# Patient Record
Sex: MALE | Race: WHITE | NOT HISPANIC OR LATINO | Employment: OTHER | URBAN - NONMETROPOLITAN AREA
[De-identification: names, ages, dates, MRNs, and addresses within clinical notes are randomized per-mention and may not be internally consistent; named-entity substitution may affect disease eponyms.]

---

## 2017-05-01 ENCOUNTER — HOSPITAL ENCOUNTER (EMERGENCY)
Facility: HOSPITAL | Age: 82
Discharge: HOME | End: 2017-05-01
Attending: EMERGENCY MEDICINE | Admitting: EMERGENCY MEDICINE
Payer: MEDICARE

## 2017-05-01 VITALS
SYSTOLIC BLOOD PRESSURE: 136 MMHG | RESPIRATION RATE: 16 BRPM | TEMPERATURE: 98 F | HEART RATE: 60 BPM | OXYGEN SATURATION: 96 % | DIASTOLIC BLOOD PRESSURE: 69 MMHG

## 2017-05-01 DIAGNOSIS — R19.7 DIARRHEA, UNSPECIFIED TYPE: ICD-10-CM

## 2017-05-01 DIAGNOSIS — R10.9 ABDOMINAL CRAMPS: ICD-10-CM

## 2017-05-01 LAB
ABO + RH BLD: NORMAL
ABO + RH BLD: NORMAL
ALBUMIN SERPL-MCNC: 3.3 G/DL (ref 3.4–5)
ALP SERPL-CCNC: 55 U/L (ref 40–150)
ALT SERPL W P-5'-P-CCNC: 19 U/L (ref 0–70)
AMYLASE SERPL-CCNC: 39 U/L (ref 30–110)
ANION GAP SERPL CALCULATED.3IONS-SCNC: 4 MMOL/L (ref 3–14)
AST SERPL W P-5'-P-CCNC: 11 U/L (ref 0–45)
BASOPHILS # BLD AUTO: 0.1 10E9/L (ref 0–0.2)
BASOPHILS NFR BLD AUTO: 0.8 %
BILIRUB SERPL-MCNC: 0.5 MG/DL (ref 0.2–1.3)
BLD GP AB SCN SERPL QL: NORMAL
BLOOD BANK CMNT PATIENT-IMP: NORMAL
BUN SERPL-MCNC: 12 MG/DL (ref 7–30)
CALCIUM SERPL-MCNC: 9.1 MG/DL (ref 8.5–10.1)
CHLORIDE SERPL-SCNC: 99 MMOL/L (ref 94–109)
CO2 SERPL-SCNC: 33 MMOL/L (ref 20–32)
CREAT SERPL-MCNC: 0.8 MG/DL (ref 0.66–1.25)
CRP SERPL-MCNC: <2.9 MG/L (ref 0–8)
DIFFERENTIAL METHOD BLD: NORMAL
EOSINOPHIL # BLD AUTO: 0.2 10E9/L (ref 0–0.7)
EOSINOPHIL NFR BLD AUTO: 3.7 %
ERYTHROCYTE [DISTWIDTH] IN BLOOD BY AUTOMATED COUNT: 13.3 % (ref 10–15)
ERYTHROCYTE [SEDIMENTATION RATE] IN BLOOD BY WESTERGREN METHOD: 8 MM/H (ref 0–20)
EST. AVERAGE GLUCOSE BLD GHB EST-MCNC: 154 MG/DL
GFR SERPL CREATININE-BSD FRML MDRD: ABNORMAL ML/MIN/1.7M2
GLUCOSE SERPL-MCNC: 146 MG/DL (ref 70–99)
HBA1C MFR BLD: 7 % (ref 4.3–6)
HCT VFR BLD AUTO: 41.7 % (ref 40–53)
HEMOCCULT STL QL IA: NEGATIVE
HGB BLD-MCNC: 14.4 G/DL (ref 13.3–17.7)
IMM GRANULOCYTES # BLD: 0 10E9/L (ref 0–0.4)
IMM GRANULOCYTES NFR BLD: 0.2 %
LIPASE SERPL-CCNC: 120 U/L (ref 73–393)
LYMPHOCYTES # BLD AUTO: 1.1 10E9/L (ref 0.8–5.3)
LYMPHOCYTES NFR BLD AUTO: 17.9 %
MAGNESIUM SERPL-MCNC: 2.2 MG/DL (ref 1.6–2.3)
MCH RBC QN AUTO: 28.1 PG (ref 26.5–33)
MCHC RBC AUTO-ENTMCNC: 34.5 G/DL (ref 31.5–36.5)
MCV RBC AUTO: 81 FL (ref 78–100)
MONOCYTES # BLD AUTO: 0.5 10E9/L (ref 0–1.3)
MONOCYTES NFR BLD AUTO: 8.7 %
NEUTROPHILS # BLD AUTO: 4.3 10E9/L (ref 1.6–8.3)
NEUTROPHILS NFR BLD AUTO: 68.7 %
NRBC # BLD AUTO: 0 10*3/UL
NRBC BLD AUTO-RTO: 0 /100
PLATELET # BLD AUTO: 265 10E9/L (ref 150–450)
POTASSIUM SERPL-SCNC: 4.3 MMOL/L (ref 3.4–5.3)
PROT SERPL-MCNC: 6.6 G/DL (ref 6.8–8.8)
RBC # BLD AUTO: 5.12 10E12/L (ref 4.4–5.9)
SODIUM SERPL-SCNC: 136 MMOL/L (ref 133–144)
SPECIMEN EXP DATE BLD: NORMAL
TSH SERPL DL<=0.05 MIU/L-ACNC: 1.02 MU/L (ref 0.4–4)
WBC # BLD AUTO: 6.2 10E9/L (ref 4–11)

## 2017-05-01 PROCEDURE — 99284 EMERGENCY DEPT VISIT MOD MDM: CPT

## 2017-05-01 PROCEDURE — 86901 BLOOD TYPING SEROLOGIC RH(D): CPT | Performed by: EMERGENCY MEDICINE

## 2017-05-01 PROCEDURE — 83735 ASSAY OF MAGNESIUM: CPT | Performed by: EMERGENCY MEDICINE

## 2017-05-01 PROCEDURE — 36415 COLL VENOUS BLD VENIPUNCTURE: CPT | Performed by: EMERGENCY MEDICINE

## 2017-05-01 PROCEDURE — 82274 ASSAY TEST FOR BLOOD FECAL: CPT | Performed by: EMERGENCY MEDICINE

## 2017-05-01 PROCEDURE — 93005 ELECTROCARDIOGRAM TRACING: CPT

## 2017-05-01 PROCEDURE — 86140 C-REACTIVE PROTEIN: CPT | Performed by: EMERGENCY MEDICINE

## 2017-05-01 PROCEDURE — 85652 RBC SED RATE AUTOMATED: CPT | Performed by: EMERGENCY MEDICINE

## 2017-05-01 PROCEDURE — 82150 ASSAY OF AMYLASE: CPT | Performed by: EMERGENCY MEDICINE

## 2017-05-01 PROCEDURE — 25000128 H RX IP 250 OP 636: Performed by: EMERGENCY MEDICINE

## 2017-05-01 PROCEDURE — 99284 EMERGENCY DEPT VISIT MOD MDM: CPT | Performed by: EMERGENCY MEDICINE

## 2017-05-01 PROCEDURE — 86900 BLOOD TYPING SEROLOGIC ABO: CPT | Performed by: EMERGENCY MEDICINE

## 2017-05-01 PROCEDURE — 84443 ASSAY THYROID STIM HORMONE: CPT | Performed by: EMERGENCY MEDICINE

## 2017-05-01 PROCEDURE — 40000788 ZZHCL STATISTIC ESTIMATED AVERAGE GLUCOSE: Performed by: EMERGENCY MEDICINE

## 2017-05-01 PROCEDURE — 83036 HEMOGLOBIN GLYCOSYLATED A1C: CPT | Mod: GZ | Performed by: EMERGENCY MEDICINE

## 2017-05-01 PROCEDURE — 86850 RBC ANTIBODY SCREEN: CPT | Performed by: EMERGENCY MEDICINE

## 2017-05-01 PROCEDURE — 85025 COMPLETE CBC W/AUTO DIFF WBC: CPT | Performed by: EMERGENCY MEDICINE

## 2017-05-01 PROCEDURE — 80053 COMPREHEN METABOLIC PANEL: CPT | Performed by: EMERGENCY MEDICINE

## 2017-05-01 PROCEDURE — 83690 ASSAY OF LIPASE: CPT | Performed by: EMERGENCY MEDICINE

## 2017-05-01 PROCEDURE — 93010 ELECTROCARDIOGRAM REPORT: CPT | Performed by: INTERNAL MEDICINE

## 2017-05-01 RX ORDER — SODIUM CHLORIDE 9 MG/ML
INJECTION, SOLUTION INTRAVENOUS CONTINUOUS
Status: DISCONTINUED | OUTPATIENT
Start: 2017-05-01 | End: 2017-05-01 | Stop reason: HOSPADM

## 2017-05-01 RX ORDER — ALLOPURINOL 100 MG/1
200 TABLET ORAL DAILY
COMMUNITY

## 2017-05-01 RX ORDER — LIDOCAINE 40 MG/G
CREAM TOPICAL
Status: DISCONTINUED | OUTPATIENT
Start: 2017-05-01 | End: 2017-05-01 | Stop reason: HOSPADM

## 2017-05-01 RX ORDER — GLIPIZIDE 10 MG/1
10 TABLET ORAL
COMMUNITY

## 2017-05-01 RX ORDER — OMEGA-3-ACID ETHYL ESTERS 1 G/1
2 CAPSULE, LIQUID FILLED ORAL DAILY
COMMUNITY

## 2017-05-01 RX ORDER — PIOGLITAZONEHYDROCHLORIDE 45 MG/1
45 TABLET ORAL DAILY
Status: ON HOLD | COMMUNITY
End: 2022-02-02

## 2017-05-01 RX ADMIN — SODIUM CHLORIDE: 9 INJECTION, SOLUTION INTRAVENOUS at 11:00

## 2017-05-01 NOTE — ED AVS SNAPSHOT
[facility]    [address]    [place] [address]    Phone:  [phone]                                       Urban Huizar   MRN: [id number]    Department:  [facility]   Date of Visit:  5/1/2017           Patient Information     Date Of Birth          1935        Your diagnoses for this visit were:     Abdominal cramps     Diarrhea, unspecified type        You were seen by Desmond To MD.      Follow-up Information     Follow up with Clinic, [facility].    Why:  As needed    Contact information:    [address]  [address]  [place] [address]  [phone]          Discharge Instructions         Uncertain Causes of Diarrhea (Adult)    Diarrhea is when stools are loose and watery. This can be caused by:    Viral infections    Bacterial infections    Food poisoning    Parasites    Irritable bowel syndrome (IBS)    Inflammatory bowel diseases such as ulcerative colitis, Crohn's disease, and celiac disease    Food intolerance, such as to lactose, the sugar found in milk and milk products    Reaction to medicines like antibiotics, laxatives, cancer drugs, and antacids  Along with diarrhea, you may also have:    Abdominal pain and cramping    Nausea and vomiting    Loss of bowel control    Fever and chills    Bloody stools  In some cases, antibiotics may help to treat diarrhea. You may have a stool sample test. This is done to see what is causing your diarrhea, and if antibiotics will help treat it. The results of a stool sample test may take up to 2 days. The healthcare provider may not give you antibiotics until he or she has the stool test results.  Diarrhea can cause dehydration. This is the loss of too much water and other fluids from the body. When this occurs, body fluid must be replaced. This can be done with oral rehydration solutions. Oral rehydration solutions are available at drugstores and grocery stores without a prescription.  Home  care  Follow all instructions given by your healthcare provider. Rest at home for the next 24 hours, or until you feel better. Avoid caffeine, tobacco, and alcohol. These can make diarrhea, cramping, and pain worse.  If taking medicines:    Don t take over-the-counter diarrhea or nausea medicines unless your healthcare provider tells you to.    You may use acetaminophen or NSAID medicines like ibuprofen or naproxen to reduce pain and fever. Don t use these if you have chronic liver or kidney disease, or ever had a stomach ulcer or gastrointestinal bleeding. Don't use NSAID medicines if you are already taking one for another condition (like arthritis) or are on daily aspirin therapy (such as for heart disease or after a stroke). Talk with your healthcare provider first.    If antibiotics were prescribed, be sure you take them until they are finished. Don t stop taking them even when you feel better. Antibiotics must be taken as a full course.  To prevent the spread of illness:    Remember that washing with soap and water and using alcohol-based  is the best way to prevent the spread of infection.    Clean the toilet after each use.    Wash your hands before eating.    Wash your hands before and after preparing food. Keep in mind that people with diarrhea or vomiting should not prepare food for others.    Wash your hands after using cutting boards, countertops, and knives that have been in contact with raw foods.    Wash and then peel fruits and vegetables.    Keep uncooked meats away from cooked and ready-to-eat foods.    Use a food thermometer when cooking. Cook poultry to at least 165 F (74 C). Cook ground meat (beef, veal, pork, lamb) to at least 160 F (71 C). Cook fresh beef, veal, lamb, and pork to at least 145 F (63 C).    Don t eat raw or undercooked eggs (poached or duane side up), poultry, meat, or unpasteurized milk and juices.  Food and drinks  The main goal while treating vomiting or diarrhea is  to prevent dehydration. This is done by taking small amounts of liquids often.    Keep in mind that liquids are more important than food right now.    Drink only small amounts of liquids at a time.    Don t force yourself to eat, especially if you are having cramping, vomiting, or diarrhea. Don t eat large amounts at a time, even if you are hungry.    If you eat, avoid fatty, greasy, spicy, or fried foods.    Don t eat dairy foods or drink milk if you have diarrhea. These can make diarrhea worse.  During the first 24 hours you can try:    Oral rehydration solutions. Do not use sports drinks. They have too much sugar and not enough electrolytes.    Soft drinks without caffeine    Ginger ale    Water (plain or flavored)    Decaf tea or coffee    Clear broth, consommé, or bouillon    Gelatin, popsicles, or frozen fruit juice bars  The second 24 hours, if you are feeling better, you can add:    Hot cereal, plain toast, bread, rolls, or crackers    Plain noodles, rice, mashed potatoes, chicken noodle soup, or rice soup    Unsweetened canned fruit (no pineapple)    Bananas  As you recover:    Limit fat intake to less than 15 grams per day. Don t eat margarine, butter, oils, mayonnaise, sauces, gravies, fried foods, peanut butter, meat, poultry, or fish.    Limit fiber. Don t eat raw or cooked vegetables, fresh fruits except bananas, or bran cereals.    Limit caffeine and chocolate.    Limit dairy.    Don t use spices or seasonings except salt.    Go back to your normal diet over time, as you feel better and your symptoms improve.    If the symptoms come back, go back to a simple diet or clear liquids.  Follow-up care  Follow up with your healthcare provider, or as advised. If a stool sample was taken or cultures were done, call the healthcare provider for the results as instructed.  Call 911  Call 911 if you have any of these symptoms:    Trouble breathing    Confusion    Extreme drowsiness or trouble walking    Loss of  consciousness    Rapid heart rate    Chest pain    Stiff neck    Seizure  When to seek medical advice  Call your healthcare provider right away if any of these occur:    Abdominal pain that gets worse    Constant lower right abdominal pain    Continued vomiting and inability to keep liquids down    Diarrhea more than 5 times a day    Blood in vomit or stool    Dark urine or no urine for 8 hours, dry mouth and tongue, tiredness, weakness, or dizziness    Drowsiness    New rash    You don t get better in 2 to 3 days    Fever of 100.4 F (38 C) or higher that doesn t get lower with medicine    1772-8610 [employer]. [address] [place], [place] [address]. All rights reserved. This information is not intended as a substitute for professional medical care. Always follow your healthcare professional's instructions.    Urban,  Your black stool appeared brown today and did not have any chemical blood in it and your blood count was excellent.  With the persistent diarrhea, it would be a good idea to have your stools checked for parasites, bacteria, and C diff all of which can cause prolonged diarrhea.  Collect this at home and bring it in within 2 days.       ED Discharge Orders     Clostridium difficile toxin B PCR           Enteric Bacteria and Virus Panel by JADIEL Stool           Ova and Parasite Exam Routine                    Review of your medicines      Our records show that you are taking the medicines listed below. If these are incorrect, please call your family doctor or clinic.        Dose / Directions Last dose taken    ALLOPURINOL PO   Dose:  100 mg        Take 100 mg by mouth   Refills:  0        GLIPIZIDE PO   Dose:  10 mg        Take 10 mg by mouth   Refills:  0        GLUCOSAMINE SULFATE PO   Dose:  1000 mg        Take 1,000 mg by mouth   Refills:  0        METFORMIN HCL PO   Dose:  1000 mg        Take 1,000 mg by mouth 2 times daily (with meals)   Refills:  0        omega-3 acid ethyl esters 1  G capsule   Commonly known as:  Lovaza   Dose:  1 g        Take 1 g by mouth 2 times daily   Refills:  0        PIOGLITAZONE HCL PO   Dose:  45 mg        Take 45 mg by mouth   Refills:  0        * UNABLE TO FIND   Dose:  1000 mg        1,000 mg Cinnamon   Refills:  0        * UNABLE TO FIND   Dose:  500 mg        500 mg Tumeric   Refills:  0        VITAMIN C PO   Dose:  1000 mg        Take 1,000 mg by mouth   Refills:  0        VITAMIN D (CHOLECALCIFEROL) PO   Dose:  5000 Units        Take 5,000 Units by mouth daily   Refills:  0        VITAMIN E NATURAL PO   Dose:  1000 Units        Take 1,000 Units by mouth   Refills:  0        * Notice:  This list has 2 medication(s) that are the same as other medications prescribed for you. Read the directions carefully, and ask your doctor or other care provider to review them with you.            Procedures and tests performed during your visit     ABO/Rh type and screen    Amylase    CBC with platelets differential    CRP inflammation    Comprehensive metabolic panel    EKG 12-lead, tracing only    Erythrocyte sedimentation rate auto    Estimated Average Glucose    Hemoglobin A1c    Lipase    Magnesium    Occult blood fecal HGB immuno    Peripheral IV catheter    TSH      Orders Needing Specimen Collection     Ordered          05/01/17 1034  UA with Microscopic - STAT, Prio: STAT, Needs to be Collected     Scheduled Task Status   05/01/17 1034 Collect UA with Microscopic Open   Order Class:  PCU Collect                05/01/17 1034  Stool culture SSCE - STAT, Prio: STAT, Needs to be Collected     Scheduled Task Status   05/01/17 1034 Collect Stool culture SSCE Open   Order Class:  PCU Collect                05/01/17 1034  Fecal Lactoferrin - STAT, Prio: STAT, Needs to be Collected     Scheduled Task Status   05/01/17 1034 Collect Fecal Lactoferrin Open   Order Class:  PCU Collect                05/01/17 1034  Clostridium difficile toxin B PCR - ROUTINE, Prio: Routine, Needs  "to be Collected     Scheduled Task Status   17 1034 Collect Clostridium difficile toxin B PCR Open   Order Class:  PCU Collect                  Pending Results     Date and Time Order Name Status Description    2017 1034 ABO/Rh type and screen In process             Pending Culture Results     No orders found from 2017 to 2017.            Thank you for choosing [facility]       Thank you for choosing [facility] for your care. Our goal is always to provide you with excellent care. Hearing back from our patients is one way we can continue to improve our services. Please take a few minutes to complete the written survey that you may receive in the mail after you visit with us. Thank you!        [email or web address] Information     [email or web address] lets you send messages to your doctor, view your test results, renew your prescriptions, schedule appointments and more. To sign up, go to [email or web address] . Click on \"Log in\" on the left side of the screen, which will take you to the Welcome page. Then click on \"Sign up Now\" on the right side of the page.     You will be asked to enter the access code listed below, as well as some personal information. Please follow the directions to create your username and password.     Your access code is: [email or web address]  Expires: 2017 11:40 AM     Your access code will  in 90 days. If you need help or a new code, please call your [facility] clinic or [phone].        Care EveryWhere ID     This is your Care EveryWhere ID. This could be used by other organizations to access your [facility] medical records  [id number]        After Visit Summary       This is your record. Keep this with you and show to your community pharmacist(s) and doctor(s) at your next visit.                  "

## 2017-05-01 NOTE — DISCHARGE INSTRUCTIONS
Uncertain Causes of Diarrhea (Adult)    Diarrhea is when stools are loose and watery. This can be caused by:    Viral infections    Bacterial infections    Food poisoning    Parasites    Irritable bowel syndrome (IBS)    Inflammatory bowel diseases such as ulcerative colitis, Crohn's disease, and celiac disease    Food intolerance, such as to lactose, the sugar found in milk and milk products    Reaction to medicines like antibiotics, laxatives, cancer drugs, and antacids  Along with diarrhea, you may also have:    Abdominal pain and cramping    Nausea and vomiting    Loss of bowel control    Fever and chills    Bloody stools  In some cases, antibiotics may help to treat diarrhea. You may have a stool sample test. This is done to see what is causing your diarrhea, and if antibiotics will help treat it. The results of a stool sample test may take up to 2 days. The healthcare provider may not give you antibiotics until he or she has the stool test results.  Diarrhea can cause dehydration. This is the loss of too much water and other fluids from the body. When this occurs, body fluid must be replaced. This can be done with oral rehydration solutions. Oral rehydration solutions are available at drugstores and grocery stores without a prescription.  Home care  Follow all instructions given by your healthcare provider. Rest at home for the next 24 hours, or until you feel better. Avoid caffeine, tobacco, and alcohol. These can make diarrhea, cramping, and pain worse.  If taking medicines:    Don t take over-the-counter diarrhea or nausea medicines unless your healthcare provider tells you to.    You may use acetaminophen or NSAID medicines like ibuprofen or naproxen to reduce pain and fever. Don t use these if you have chronic liver or kidney disease, or ever had a stomach ulcer or gastrointestinal bleeding. Don't use NSAID medicines if you are already taking one for another condition (like arthritis) or are on daily  aspirin therapy (such as for heart disease or after a stroke). Talk with your healthcare provider first.    If antibiotics were prescribed, be sure you take them until they are finished. Don t stop taking them even when you feel better. Antibiotics must be taken as a full course.  To prevent the spread of illness:    Remember that washing with soap and water and using alcohol-based  is the best way to prevent the spread of infection.    Clean the toilet after each use.    Wash your hands before eating.    Wash your hands before and after preparing food. Keep in mind that people with diarrhea or vomiting should not prepare food for others.    Wash your hands after using cutting boards, countertops, and knives that have been in contact with raw foods.    Wash and then peel fruits and vegetables.    Keep uncooked meats away from cooked and ready-to-eat foods.    Use a food thermometer when cooking. Cook poultry to at least 165 F (74 C). Cook ground meat (beef, veal, pork, lamb) to at least 160 F (71 C). Cook fresh beef, veal, lamb, and pork to at least 145 F (63 C).    Don t eat raw or undercooked eggs (poached or duane side up), poultry, meat, or unpasteurized milk and juices.  Food and drinks  The main goal while treating vomiting or diarrhea is to prevent dehydration. This is done by taking small amounts of liquids often.    Keep in mind that liquids are more important than food right now.    Drink only small amounts of liquids at a time.    Don t force yourself to eat, especially if you are having cramping, vomiting, or diarrhea. Don t eat large amounts at a time, even if you are hungry.    If you eat, avoid fatty, greasy, spicy, or fried foods.    Don t eat dairy foods or drink milk if you have diarrhea. These can make diarrhea worse.  During the first 24 hours you can try:    Oral rehydration solutions. Do not use sports drinks. They have too much sugar and not enough electrolytes.    Soft drinks without  caffeine    Ginger ale    Water (plain or flavored)    Decaf tea or coffee    Clear broth, consommé, or bouillon    Gelatin, popsicles, or frozen fruit juice bars  The second 24 hours, if you are feeling better, you can add:    Hot cereal, plain toast, bread, rolls, or crackers    Plain noodles, rice, mashed potatoes, chicken noodle soup, or rice soup    Unsweetened canned fruit (no pineapple)    Bananas  As you recover:    Limit fat intake to less than 15 grams per day. Don t eat margarine, butter, oils, mayonnaise, sauces, gravies, fried foods, peanut butter, meat, poultry, or fish.    Limit fiber. Don t eat raw or cooked vegetables, fresh fruits except bananas, or bran cereals.    Limit caffeine and chocolate.    Limit dairy.    Don t use spices or seasonings except salt.    Go back to your normal diet over time, as you feel better and your symptoms improve.    If the symptoms come back, go back to a simple diet or clear liquids.  Follow-up care  Follow up with your healthcare provider, or as advised. If a stool sample was taken or cultures were done, call the healthcare provider for the results as instructed.  Call 911  Call 911 if you have any of these symptoms:    Trouble breathing    Confusion    Extreme drowsiness or trouble walking    Loss of consciousness    Rapid heart rate    Chest pain    Stiff neck    Seizure  When to seek medical advice  Call your healthcare provider right away if any of these occur:    Abdominal pain that gets worse    Constant lower right abdominal pain    Continued vomiting and inability to keep liquids down    Diarrhea more than 5 times a day    Blood in vomit or stool    Dark urine or no urine for 8 hours, dry mouth and tongue, tiredness, weakness, or dizziness    Drowsiness    New rash    You don t get better in 2 to 3 days    Fever of 100.4 F (38 C) or higher that doesn t get lower with medicine    [id number] [employer]. [address] [place] [address], [place] [address].  All rights reserved. This information is not intended as a substitute for professional medical care. Always follow your healthcare professional's instructions.    Urban,  Your black stool appeared brown today and did not have any chemical blood in it and your blood count was excellent.  With the persistent diarrhea, it would be a good idea to have your stools checked for parasites, bacteria, and C diff all of which can cause prolonged diarrhea.  Collect this at home and bring it in within 2 days.

## 2017-05-01 NOTE — ED NOTES
Patient complaining of gas for the last week.  Has been taking antacids since last week.  Noticed black stools on Saturday.  Patients skin is pink and denies vertigo or pain.  VSS.

## 2017-05-01 NOTE — ED PROVIDER NOTES
History     Chief Complaint   Patient presents with     Melena     since saturday     HPI  Urban Huizar is a 81 year old male who has had dark stools for 2 days.  He has been having loose 2-3 x/day diarrhea for the past 3 months giving him mild distress and cramping before he goes. No hx of gi bleed in past and on no meds for GERD or the like. Took pepto bismol sometime in the past 3 days.      I have reviewed the Medications, Allergies, Past Medical and Surgical History, and Social History in the Epic system.    Review of Systems   Gastrointestinal: Positive for blood in stool.   All other systems reviewed and are negative.    Physical Exam   BP: 136/63  Pulse: 62  Temp: 97.8  F (36.6  C)  Resp: 16  SpO2: 96 %  Physical Exam   Constitutional: He appears well-developed and well-nourished.   HENT:   Head: Normocephalic and atraumatic.   Eyes: Conjunctivae and EOM are normal. Pupils are equal, round, and reactive to light.   Neck: Normal range of motion. Neck supple.   Cardiovascular: Normal rate and regular rhythm.    Pulmonary/Chest: Effort normal and breath sounds normal.   Abdominal: Soft. Bowel sounds are normal. He exhibits no distension. There is no tenderness. There is no rebound.   Genitourinary: Rectal exam shows guaiac negative stool.   Genitourinary Comments: Soft dark brown hemoccult (-) stool.    Musculoskeletal: Normal range of motion.   Neurological: He is alert.     ED Course     ED Course     Procedures  ECG  Sinus bradycardia, otherwise normal ECG, QTc 380 ms    Critical Care time:  none  Labs Ordered and Resulted from Time of ED Arrival Up to the Time of Departure from the ED   COMPREHENSIVE METABOLIC PANEL - Abnormal; Notable for the following:        Result Value    Carbon Dioxide 33 (*)     Glucose 146 (*)     Albumin 3.3 (*)     Protein Total 6.6 (*)     All other components within normal limits   HEMOGLOBIN A1C - Abnormal; Notable for the following:     Hemoglobin A1C 7.0 (*)     All  other components within normal limits   CBC WITH PLATELETS DIFFERENTIAL   CRP INFLAMMATION   ERYTHROCYTE SEDIMENTATION RATE AUTO   MAGNESIUM   LIPASE   AMYLASE   TSH   OCCULT BLOOD FECAL HGB IMMUNO   ESTIMATED AVERAGE GLUCOSE   ORTHOSTATIC BLOOD PRESSURE AND PULSE   PERIPHERAL IV CATHETER   ABO/RH TYPE AND SCREEN       Assessments & Plan (with Medical Decision Making)   Urban had dark stool but hemoccult was negative and Hgb was 14.4 with normal platelets.  Labs were otherwise unremarkable and await post discharge out patient stool speciment to rule out toxic diarrhea or c diff.   I have reviewed the nursing notes.    I have reviewed the findings, diagnosis, plan and need for follow up with the patient.    Discharge Medication List as of 5/1/2017 11:43 AM          Final diagnoses:   Abdominal cramps   Diarrhea, unspecified type       5/1/2017   [facility]     Desmond To MD  05/03/17 0007

## 2017-05-01 NOTE — ED NOTES
"Pt presents with wife with c/o one episode of black stool Sunday afternoon. Pt states he took pepto bismol Sunday morning prior to the dark stools. Pt states that he did have some abd pain, but he feels better after having a bowel movement today. Pt denies n/v/d and dysuria. Pt also denies dizziness and states he feels \"normal.\" Pt alert and oriented and ambulatory without difficulty to ED room 2.   "

## 2017-05-01 NOTE — ED AVS SNAPSHOT
[facility]    [address] [address]    Phone:  [phone]                                       Urban Huizar   MRN: [id number]    Department:  [facility]   Date of Visit:  5/1/2017           After Visit Summary Signature Page     I have received my discharge instructions, and my questions have been answered. I have discussed any challenges I see with this plan with the nurse or doctor.    ..........................................................................................................................................  Patient/Patient Representative Signature      ..........................................................................................................................................  Patient Representative Print Name and Relationship to Patient    ..................................................               ................................................  Date                                            Time    ..........................................................................................................................................  Reviewed by Signature/Title    ...................................................              ..............................................  Date                                                            Time

## 2017-05-03 ASSESSMENT — ENCOUNTER SYMPTOMS: BLOOD IN STOOL: 1

## 2018-02-01 ENCOUNTER — TRANSFERRED RECORDS (OUTPATIENT)
Dept: HEALTH INFORMATION MANAGEMENT | Facility: HOSPITAL | Age: 83
End: 2018-02-01

## 2018-02-15 ENCOUNTER — TRANSFERRED RECORDS (OUTPATIENT)
Dept: HEALTH INFORMATION MANAGEMENT | Facility: HOSPITAL | Age: 83
End: 2018-02-15

## 2018-02-26 ENCOUNTER — ANESTHESIA EVENT (OUTPATIENT)
Dept: SURGERY | Facility: HOSPITAL | Age: 83
End: 2018-02-26
Payer: MEDICARE

## 2018-02-26 ENCOUNTER — ANESTHESIA (OUTPATIENT)
Dept: SURGERY | Facility: HOSPITAL | Age: 83
End: 2018-02-26
Payer: MEDICARE

## 2018-02-26 ENCOUNTER — HOSPITAL ENCOUNTER (OUTPATIENT)
Facility: HOSPITAL | Age: 83
Discharge: HOME | End: 2018-02-26
Attending: OPHTHALMOLOGY | Admitting: OPHTHALMOLOGY
Payer: MEDICARE

## 2018-02-26 ENCOUNTER — SURGERY (OUTPATIENT)
Age: 83
End: 2018-02-26

## 2018-02-26 VITALS
TEMPERATURE: 98 F | BODY MASS INDEX: 33.72 KG/M2 | WEIGHT: 254.4 LBS | DIASTOLIC BLOOD PRESSURE: 71 MMHG | OXYGEN SATURATION: 97 % | HEIGHT: 73 IN | SYSTOLIC BLOOD PRESSURE: 151 MMHG

## 2018-02-26 PROCEDURE — 37000009 ZZH ANESTHESIA TECHNICAL FEE, EACH ADDTL 15 MIN: Performed by: OPHTHALMOLOGY

## 2018-02-26 PROCEDURE — 36000058 ZZH SURGERY LEVEL 3 EA 15 ADDTL MIN: Performed by: OPHTHALMOLOGY

## 2018-02-26 PROCEDURE — 71000027 ZZH RECOVERY PHASE 2 EACH 15 MINS: Performed by: OPHTHALMOLOGY

## 2018-02-26 PROCEDURE — 99100 ANES PT EXTEME AGE<1 YR&>70: CPT | Performed by: NURSE ANESTHETIST, CERTIFIED REGISTERED

## 2018-02-26 PROCEDURE — 37000008 ZZH ANESTHESIA TECHNICAL FEE, 1ST 30 MIN: Performed by: OPHTHALMOLOGY

## 2018-02-26 PROCEDURE — 66984 XCAPSL CTRC RMVL W/O ECP: CPT | Performed by: ANESTHESIOLOGY

## 2018-02-26 PROCEDURE — 25000128 H RX IP 250 OP 636: Performed by: OPHTHALMOLOGY

## 2018-02-26 PROCEDURE — 25000125 ZZHC RX 250: Performed by: OPHTHALMOLOGY

## 2018-02-26 PROCEDURE — 25000125 ZZHC RX 250: Performed by: NURSE ANESTHETIST, CERTIFIED REGISTERED

## 2018-02-26 PROCEDURE — 27210794 ZZH OR GENERAL SUPPLY STERILE: Performed by: OPHTHALMOLOGY

## 2018-02-26 PROCEDURE — 25000132 ZZH RX MED GY IP 250 OP 250 PS 637: Mod: GY | Performed by: OPHTHALMOLOGY

## 2018-02-26 PROCEDURE — C9447 INJ, PHENYLEPHRINE KETOROLAC: HCPCS | Performed by: OPHTHALMOLOGY

## 2018-02-26 PROCEDURE — V2632 POST CHMBR INTRAOCULAR LENS: HCPCS | Performed by: OPHTHALMOLOGY

## 2018-02-26 PROCEDURE — 40000306 ZZH STATISTIC PRE PROC ASSESS II: Performed by: OPHTHALMOLOGY

## 2018-02-26 PROCEDURE — A9270 NON-COVERED ITEM OR SERVICE: HCPCS | Mod: GY | Performed by: OPHTHALMOLOGY

## 2018-02-26 PROCEDURE — 36000056 ZZH SURGERY LEVEL 3 1ST 30 MIN: Performed by: OPHTHALMOLOGY

## 2018-02-26 PROCEDURE — 25000128 H RX IP 250 OP 636: Performed by: NURSE ANESTHETIST, CERTIFIED REGISTERED

## 2018-02-26 PROCEDURE — 66984 XCAPSL CTRC RMVL W/O ECP: CPT | Performed by: NURSE ANESTHETIST, CERTIFIED REGISTERED

## 2018-02-26 DEVICE — LENS-SOFPORT 19.0: Type: IMPLANTABLE DEVICE | Site: EYE | Status: FUNCTIONAL

## 2018-02-26 RX ORDER — HYDRALAZINE HYDROCHLORIDE 20 MG/ML
2.5-5 INJECTION INTRAMUSCULAR; INTRAVENOUS EVERY 10 MIN PRN
Status: DISCONTINUED | OUTPATIENT
Start: 2018-02-26 | End: 2018-02-26 | Stop reason: HOSPADM

## 2018-02-26 RX ORDER — PHENYLEPHRINE HYDROCHLORIDE 100 MG/ML
1 SOLUTION/ DROPS OPHTHALMIC
Status: COMPLETED | OUTPATIENT
Start: 2018-02-26 | End: 2018-02-26

## 2018-02-26 RX ORDER — OXYCODONE HYDROCHLORIDE 5 MG/1
5 TABLET ORAL EVERY 4 HOURS PRN
Status: DISCONTINUED | OUTPATIENT
Start: 2018-02-26 | End: 2018-02-26 | Stop reason: HOSPADM

## 2018-02-26 RX ORDER — MOXIFLOXACIN 5 MG/ML
SOLUTION/ DROPS OPHTHALMIC PRN
Status: DISCONTINUED | OUTPATIENT
Start: 2018-02-26 | End: 2018-02-26 | Stop reason: HOSPADM

## 2018-02-26 RX ORDER — MOXIFLOXACIN 5 MG/ML
1 SOLUTION/ DROPS OPHTHALMIC
Status: DISCONTINUED | OUTPATIENT
Start: 2018-02-26 | End: 2018-02-26 | Stop reason: HOSPADM

## 2018-02-26 RX ORDER — ONDANSETRON 2 MG/ML
4 INJECTION INTRAMUSCULAR; INTRAVENOUS EVERY 30 MIN PRN
Status: DISCONTINUED | OUTPATIENT
Start: 2018-02-26 | End: 2018-02-26 | Stop reason: HOSPADM

## 2018-02-26 RX ORDER — NALOXONE HYDROCHLORIDE 0.4 MG/ML
.1-.4 INJECTION, SOLUTION INTRAMUSCULAR; INTRAVENOUS; SUBCUTANEOUS
Status: DISCONTINUED | OUTPATIENT
Start: 2018-02-26 | End: 2018-02-26 | Stop reason: HOSPADM

## 2018-02-26 RX ORDER — PROPARACAINE HYDROCHLORIDE 5 MG/ML
1 SOLUTION/ DROPS OPHTHALMIC ONCE
Status: COMPLETED | OUTPATIENT
Start: 2018-02-26 | End: 2018-02-26

## 2018-02-26 RX ORDER — LEVOBUNOLOL HYDROCHLORIDE 5 MG/ML
SOLUTION/ DROPS OPHTHALMIC PRN
Status: DISCONTINUED | OUTPATIENT
Start: 2018-02-26 | End: 2018-02-26 | Stop reason: HOSPADM

## 2018-02-26 RX ORDER — FENTANYL CITRATE 50 UG/ML
25-50 INJECTION, SOLUTION INTRAMUSCULAR; INTRAVENOUS
Status: DISCONTINUED | OUTPATIENT
Start: 2018-02-26 | End: 2018-02-26 | Stop reason: HOSPADM

## 2018-02-26 RX ORDER — TETRACAINE HYDROCHLORIDE 5 MG/ML
SOLUTION OPHTHALMIC PRN
Status: DISCONTINUED | OUTPATIENT
Start: 2018-02-26 | End: 2018-02-26 | Stop reason: HOSPADM

## 2018-02-26 RX ORDER — DICLOFENAC SODIUM 1 MG/ML
1 SOLUTION/ DROPS OPHTHALMIC
Status: CANCELLED | OUTPATIENT
Start: 2018-02-26

## 2018-02-26 RX ORDER — ALBUTEROL SULFATE 0.83 MG/ML
2.5 SOLUTION RESPIRATORY (INHALATION) EVERY 4 HOURS PRN
Status: DISCONTINUED | OUTPATIENT
Start: 2018-02-26 | End: 2018-02-26 | Stop reason: HOSPADM

## 2018-02-26 RX ORDER — PHENYLEPHRINE HYDROCHLORIDE 25 MG/ML
1 SOLUTION/ DROPS OPHTHALMIC
Status: DISCONTINUED | OUTPATIENT
Start: 2018-02-26 | End: 2018-02-26 | Stop reason: HOSPADM

## 2018-02-26 RX ORDER — MEPERIDINE HYDROCHLORIDE 25 MG/ML
12.5 INJECTION INTRAMUSCULAR; INTRAVENOUS; SUBCUTANEOUS
Status: DISCONTINUED | OUTPATIENT
Start: 2018-02-26 | End: 2018-02-26 | Stop reason: HOSPADM

## 2018-02-26 RX ORDER — DEXAMETHASONE SODIUM PHOSPHATE 4 MG/ML
4 INJECTION, SOLUTION INTRA-ARTICULAR; INTRALESIONAL; INTRAMUSCULAR; INTRAVENOUS; SOFT TISSUE EVERY 10 MIN PRN
Status: DISCONTINUED | OUTPATIENT
Start: 2018-02-26 | End: 2018-02-26 | Stop reason: HOSPADM

## 2018-02-26 RX ORDER — LIDOCAINE HYDROCHLORIDE 20 MG/ML
INJECTION, SOLUTION INFILTRATION; PERINEURAL PRN
Status: DISCONTINUED | OUTPATIENT
Start: 2018-02-26 | End: 2018-02-26

## 2018-02-26 RX ORDER — LIDOCAINE HYDROCHLORIDE 10 MG/ML
INJECTION, SOLUTION EPIDURAL; INFILTRATION; INTRACAUDAL; PERINEURAL PRN
Status: DISCONTINUED | OUTPATIENT
Start: 2018-02-26 | End: 2018-02-26 | Stop reason: HOSPADM

## 2018-02-26 RX ORDER — LABETALOL HYDROCHLORIDE 5 MG/ML
10 INJECTION, SOLUTION INTRAVENOUS
Status: DISCONTINUED | OUTPATIENT
Start: 2018-02-26 | End: 2018-02-26 | Stop reason: HOSPADM

## 2018-02-26 RX ORDER — ACETAMINOPHEN 325 MG/1
650 TABLET ORAL ONCE
Status: COMPLETED | OUTPATIENT
Start: 2018-02-26 | End: 2018-02-26

## 2018-02-26 RX ORDER — CYCLOPENTOLATE HYDROCHLORIDE 20 MG/ML
1 SOLUTION/ DROPS OPHTHALMIC
Status: COMPLETED | OUTPATIENT
Start: 2018-02-26 | End: 2018-02-26

## 2018-02-26 RX ORDER — PROMETHAZINE HYDROCHLORIDE 25 MG/ML
12.5 INJECTION, SOLUTION INTRAMUSCULAR; INTRAVENOUS
Status: DISCONTINUED | OUTPATIENT
Start: 2018-02-26 | End: 2018-02-26 | Stop reason: HOSPADM

## 2018-02-26 RX ORDER — PILOCARPINE HYDROCHLORIDE 10 MG/ML
SOLUTION/ DROPS OPHTHALMIC PRN
Status: DISCONTINUED | OUTPATIENT
Start: 2018-02-26 | End: 2018-02-26 | Stop reason: HOSPADM

## 2018-02-26 RX ORDER — ONDANSETRON 4 MG/1
4 TABLET, ORALLY DISINTEGRATING ORAL EVERY 30 MIN PRN
Status: DISCONTINUED | OUTPATIENT
Start: 2018-02-26 | End: 2018-02-26 | Stop reason: HOSPADM

## 2018-02-26 RX ADMIN — LIDOCAINE HYDROCHLORIDE 3 ML: 10 INJECTION, SOLUTION EPIDURAL; INFILTRATION; INTRACAUDAL; PERINEURAL at 08:44

## 2018-02-26 RX ADMIN — ACETAMINOPHEN 650 MG: 325 TABLET, FILM COATED ORAL at 07:23

## 2018-02-26 RX ADMIN — PHENYLEPHRINE HYDROCHLORIDE 1 DROP: 100 SOLUTION/ DROPS OPHTHALMIC at 07:53

## 2018-02-26 RX ADMIN — TETRACAINE HYDROCHLORIDE 2 DROP: 5 SOLUTION OPHTHALMIC at 08:45

## 2018-02-26 RX ADMIN — CYCLOPENTOLATE HYDROCHLORIDE 1 DROP: 20 SOLUTION/ DROPS OPHTHALMIC at 07:32

## 2018-02-26 RX ADMIN — PHENYLEPHRINE AND KETOROLAC 300 ML GIVEN: 10.16; 2.88 INJECTION, SOLUTION, CONCENTRATE INTRAOCULAR at 08:44

## 2018-02-26 RX ADMIN — LEVOBUNOLOL HYDROCHLORIDE 1 DROP: 5 SOLUTION/ DROPS OPHTHALMIC at 08:44

## 2018-02-26 RX ADMIN — MIDAZOLAM 1 MG: 1 INJECTION INTRAMUSCULAR; INTRAVENOUS at 08:24

## 2018-02-26 RX ADMIN — PROPARACAINE HYDROCHLORIDE 1 DROP: 5 SOLUTION/ DROPS OPHTHALMIC at 07:31

## 2018-02-26 RX ADMIN — PILOCARPINE HYDROCHLORIDE 1 DROP: 10 SOLUTION/ DROPS OPHTHALMIC at 08:45

## 2018-02-26 RX ADMIN — CYCLOPENTOLATE HYDROCHLORIDE 1 DROP: 20 SOLUTION/ DROPS OPHTHALMIC at 07:39

## 2018-02-26 RX ADMIN — LIDOCAINE HYDROCHLORIDE 80 MG: 20 INJECTION, SOLUTION INFILTRATION; PERINEURAL at 08:38

## 2018-02-26 RX ADMIN — PHENYLEPHRINE HYDROCHLORIDE 1 DROP: 25 SOLUTION/ DROPS OPHTHALMIC at 07:33

## 2018-02-26 RX ADMIN — MOXIFLOXACIN 0.5 ML: 5 SOLUTION/ DROPS OPHTHALMIC at 07:40

## 2018-02-26 RX ADMIN — PHENYLEPHRINE HYDROCHLORIDE 1 DROP: 25 SOLUTION/ DROPS OPHTHALMIC at 07:40

## 2018-02-26 RX ADMIN — MOXIFLOXACIN HYDROCHLORIDE 1 DROP: 5 SOLUTION/ DROPS OPHTHALMIC at 08:46

## 2018-02-26 RX ADMIN — Medication 0.8 ML: at 08:45

## 2018-02-26 ASSESSMENT — LIFESTYLE VARIABLES: TOBACCO_USE: 1

## 2018-02-26 NOTE — IP AVS SNAPSHOT
MRN:[id number]                      After Visit Summary   2/26/2018    Urban Huizar    MRN: [id number]           Thank you!     Thank you for choosing [facility] for your care. Our goal is always to provide you with excellent care. Hearing back from our patients is one way we can continue to improve our services. Please take a few minutes to complete the written survey that you may receive in the mail after you visit with us. Thank you!        Patient Information     Date Of Birth          1935        About your hospital stay     You were admitted on:  February 26, 2018 You last received care in the:  [facility]    You were discharged on:  February 26, 2018        Reason for your hospital stay       Cataract surgery left eye done.                  Who to Call     For medical emergencies, please call 911.  For non-urgent questions about your medical care, please call your primary care provider or clinic, [phone]  For questions related to your surgery, please call your surgery clinic        Attending Provider     Provider [facility]    Baljinder Shannon MD Ophthalmology       Primary Care Provider Office Phone # Fax #    [facility] [phone]      After Care Instructions     Nursing Communication 1       Eyedrops, shield, and activities per post op pamphlet.            Patient care order       Patient has Vigamox and Durezol and Ilevro drops at home.  These should be used:  Vigamox 1 drop operative eye QID for 1 week.  Durezol 1 drop operative eye BID for 4 weeks.  Ilevro 1 drop operative eye QD for 6 weeks.                  Follow-up Appointments     Follow-up and recommended labs and tests       Follow up tomorrow at the [facility].                  Further instructions from your care team           Post-Anesthesia Patient Instructions    IMMEDIATELY FOLLOWING SURGERY:  Do not drive or operate machinery for the first twenty four hours after  "surgery.  Do not make any important decisions for twenty four hours after surgery or while taking narcotic pain medications or sedatives.  If you develop intractable nausea and vomiting or a severe headache please notify your doctor immediately.    FOLLOW-UP:  Please make an appointment with your surgeon as instructed. You do not need to follow up with anesthesia unless specifically instructed to do so.    WOUND CARE INSTRUCTIONS (if applicable):  Keep a dry clean dressing on the anesthesia/puncture wound site if there is drainage.  Once the wound has quit draining you may leave it open to air.  Generally you should leave the bandage intact for twenty four hours unless there is drainage.  If the epidural site drains for more than 36-48 hours please call the anesthesia department.    QUESTIONS?:  Please feel free to call your physician or the hospital  if you have any questions, and they will be happy to assist you.       Pending Results     No orders found from 2/24/2018 to 2/27/2018.            Admission Information     Date & Time Provider Department Dept. Phone    2/26/2018 Baljinder Shannon MD [facility] Preop/Phase [phone]      Your Vitals Were     Blood Pressure Temperature Height Weight Pulse Oximetry BMI (Body Mass Index)    133/67 98  F (36.7  C) (Axillary) 1.854 m (6' 1\") 115.4 kg (254 lb 6.4 oz) 95% 33.56 kg/m2      [email or web address] Information     [employer] lets you send messages to your doctor, view your test results, renew your prescriptions, schedule appointments and more. To sign up, go to [email or web address] . Click on \"Log in\" on the left side of the screen, which will take you to the Welcome page. Then click on \"Sign up Now\" on the right side of the page.     You will be asked to enter the access code listed below, as well as some personal information. Please follow the directions to create your username and password.     Your access code is: [email or web address]  Expires: 5/27/2018  9:05 AM     Your " access code will  in 90 days. If you need help or a new code, please call your [facility] clinic or [phone].        Care EveryWhere ID     This is your Care EveryWhere ID. This could be used by other organizations to access your [facility] medical records  [id number]        Equal Access to Services     LIVAN REINA : Hadii aad ku hadareno Soomaali, waaxda luqadaha, qaybta kaalmada asuncion, maryana maxwell. So [facility] [phone].    ATENCIÓN: Si habla español, tiene a cortez disposición servicios gratuitos de asistencia lingüística. Samanthaame al [phone].    We comply with applicable federal civil rights laws and [place] laws. We do not discriminate on the basis of race, color, national origin, age, disability, sex, sexual orientation, or gender identity.               Review of your medicines      CONTINUE these medicines which have NOT CHANGED        Dose / Directions    ALLOPURINOL PO        Dose:  200 mg   Take 200 mg by mouth daily   Refills:  0       GLIPIZIDE PO        Dose:  10 mg   Take 10 mg by mouth Take 2 tablets by mouth every day and take one tablet every evening to decrease blood sugar.  Take 30 minutes before meal.   Refills:  0       GLUCOSAMINE SULFATE PO        Dose:  2000 mg   Take 2,000 mg by mouth 2 times daily   Refills:  0       METFORMIN HCL PO        Dose:  1000 mg   Take 1,000 mg by mouth 2 times daily (with meals)   Refills:  0       omega-3 acid ethyl esters 1 G capsule   Commonly known as:  Lovaza        Dose:  1200 mg   Take 1,200 mg by mouth 2 times daily   Refills:  0       PIOGLITAZONE HCL PO        Dose:  45 mg   Take 45 mg by mouth daily   Refills:  0       * UNABLE TO FIND        Dose:  1000 mg   1,000 mg daily Cinnamon   Refills:  0       * UNABLE TO FIND        Dose:  500 mg   500 mg daily Tumeric   Refills:  0       VITAMIN C PO        Dose:  1000 mg   Take 1,000 mg by mouth 2 times daily   Refills:  0       VITAMIN D (CHOLECALCIFEROL) PO         Dose:  5000 Units   Take 5,000 Units by mouth daily   Refills:  0       VITAMIN E NATURAL PO        Dose:  1000 Units   Take 1,000 Units by mouth   Refills:  0       * Notice:  This list has 2 medication(s) that are the same as other medications prescribed for you. Read the directions carefully, and ask your doctor or other care provider to review them with you.             Protect others around you: Learn how to safely use, store and throw away your medicines at www.disposemymeds.org.             Medication List: This is a list of all your medications and when to take them. Check marks below indicate your daily home schedule. Keep this list as a reference.      Medications           Morning Afternoon Evening Bedtime As Needed    ALLOPURINOL PO   Take 200 mg by mouth daily                                GLIPIZIDE PO   Take 10 mg by mouth Take 2 tablets by mouth every day and take one tablet every evening to decrease blood sugar.  Take 30 minutes before meal.                                GLUCOSAMINE SULFATE PO   Take 2,000 mg by mouth 2 times daily                                METFORMIN HCL PO   Take 1,000 mg by mouth 2 times daily (with meals)                                omega-3 acid ethyl esters 1 G capsule   Commonly known as:  Lovaza   Take 1,200 mg by mouth 2 times daily                                PIOGLITAZONE HCL PO   Take 45 mg by mouth daily                                * UNABLE TO FIND   1,000 mg daily Cinnamon   Last time this was given:  300 ml given on 2/26/2018  8:44 AM                                * UNABLE TO FIND   500 mg daily Tumeric   Last time this was given:  300 ml given on 2/26/2018  8:44 AM                                VITAMIN C PO   Take 1,000 mg by mouth 2 times daily                                VITAMIN D (CHOLECALCIFEROL) PO   Take 5,000 Units by mouth daily                                VITAMIN E NATURAL PO   Take 1,000 Units by mouth                                *  Notice:  This list has 2 medication(s) that are the same as other medications prescribed for you. Read the directions carefully, and ask your doctor or other care provider to review them with you.

## 2018-02-26 NOTE — ANESTHESIA CARE TRANSFER NOTE
Patient: Urban Huizar    Procedure(s):  PHACOEMULSIFICATION CATARACT EXTRACTION POSTERIOR CHAMBER LENS LEFT - Wound Class: I-Clean    Diagnosis: COMBINED CATARACTS LEFT  Diagnosis Additional Information: No value filed.    Anesthesia Type:   MAC     Note:  Airway :Room Air  Patient transferred to:Phase II  Comments: VSS, report to PACU RN.Handoff Report: Identifed the Patient, Identified the Reponsible Provider, Reviewed the pertinent medical history, Discussed the surgical course, Reviewed Intra-OP anesthesia mangement and issues during anesthesia, Set expectations for post-procedure period and Allowed opportunity for questions and acknowledgement of understanding      Vitals: (Last set prior to Anesthesia Care Transfer)    CRNA VITALS  2/26/2018 0828 - 2/26/2018 0859      2/26/2018             Resp Rate (set): 8                Electronically Signed By: RAYMUNDO De La O CRNA  February 26, 2018  8:59 AM

## 2018-02-26 NOTE — IP AVS SNAPSHOT
[facility]    [address] [address]    Phone:  [phone]                                       After Visit Summary   2/26/2018    Urban Huizar    MRN: [id number]           After Visit Summary Signature Page     I have received my discharge instructions, and my questions have been answered. I have discussed any challenges I see with this plan with the nurse or doctor.    ..........................................................................................................................................  Patient/Patient Representative Signature      ..........................................................................................................................................  Patient Representative Print Name and Relationship to Patient    ..................................................               ................................................  Date                                            Time    ..........................................................................................................................................  Reviewed by Signature/Title    ...................................................              ..............................................  Date                                                            Time

## 2018-02-26 NOTE — INTERVAL H&P NOTE
History and physical reviewed. Patient examined. No interval change in condition.  Baljinder Shannon

## 2018-02-26 NOTE — ANESTHESIA PREPROCEDURE EVALUATION
Anesthesia Evaluation     . Pt has had prior anesthetic.     No history of anesthetic complications          ROS/MED HX    ENT/Pulmonary:     (+)tobacco use, Past use quit 1977 packs/day  , . .    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 : . . . :. .       METS/Exercise Tolerance:     Hematologic:  - neg hematologic  ROS       Musculoskeletal:   (+) arthritis, , , other musculoskeletal- Gout      GI/Hepatic:  - neg GI/hepatic ROS       Renal/Genitourinary:  - ROS Renal section negative       Endo:     (+) type II DM Last HgA1c: 7.6 date: 2/15/2018 Obesity, .      Psychiatric:  - neg psychiatric ROS       Infectious Disease:  - neg infectious disease ROS       Malignancy:      - no malignancy   Other:    - neg other ROS                 Physical Exam      Airway   Mallampati: III  TM distance: >3 FB  Neck ROM: full    Dental   (+) upper dentures, lower dentures and missing    Cardiovascular   Rhythm and rate: regular and normal      Pulmonary    breath sounds clear to auscultation                    Anesthesia Plan      History & Physical Review  History and physical reviewed and following examination; no interval change.    ASA Status:  3 .    NPO Status:  > 8 hours    Plan for MAC with Other induction. Maintenance will be Other.  Reason for MAC:  Deep or markedly invasive procedure (G8), Procedure to face, neck, head or breast and Chronic cardiopulmonary disease (G9)  PONV prophylaxis:  Ondansetron (or other 5HT-3) and Dexamethasone or Solumedrol       Postoperative Care  Postoperative pain management:  IV analgesics, Oral pain medications and Multi-modal analgesia.      Consents  Anesthetic plan, risks, benefits and alternatives discussed with:  Patient..                          .

## 2018-02-26 NOTE — OR NURSING
Pateint discharged to home .  Tamy score 20. Pain level 0/10.  Discharged from unit via walking .

## 2018-02-26 NOTE — ANESTHESIA POSTPROCEDURE EVALUATION
Patient: Urban Huizar    Procedure(s):  PHACOEMULSIFICATION CATARACT EXTRACTION POSTERIOR CHAMBER LENS LEFT - Wound Class: I-Clean    Diagnosis:COMBINED CATARACTS LEFT  Diagnosis Additional Information: No value filed.    Anesthesia Type:  MAC    Note:  Anesthesia Post Evaluation    Patient location during evaluation: Phase 2 and Bedside  Patient participation: Able to fully participate in evaluation  Level of consciousness: awake and alert  Pain management: adequate  Airway patency: patent  Cardiovascular status: acceptable  Respiratory status: acceptable  Hydration status: stable  PONV: none     Anesthetic complications: None          Last vitals:  Vitals:    02/26/18 0910 02/26/18 0915 02/26/18 0920   BP: 151/75 151/71    Temp:      SpO2: 98% 98% 97%         Electronically Signed By: Willam Martins MD  February 26, 2018  9:59 AM

## 2019-08-19 ENCOUNTER — APPOINTMENT (OUTPATIENT)
Dept: GENERAL RADIOLOGY | Facility: HOSPITAL | Age: 84
DRG: 159 | End: 2019-08-19
Attending: PHYSICIAN ASSISTANT
Payer: MEDICARE

## 2019-08-19 ENCOUNTER — APPOINTMENT (OUTPATIENT)
Dept: GENERAL RADIOLOGY | Facility: HOSPITAL | Age: 84
DRG: 159 | End: 2019-08-19
Attending: INTERNAL MEDICINE
Payer: MEDICARE

## 2019-08-19 ENCOUNTER — HOSPITAL ENCOUNTER (INPATIENT)
Facility: HOSPITAL | Age: 84
LOS: 1 days | Discharge: ANOTHER HOSPITAL | DRG: 159 | End: 2019-08-19
Attending: PHYSICIAN ASSISTANT | Admitting: INTERNAL MEDICINE
Payer: MEDICARE

## 2019-08-19 ENCOUNTER — ANESTHESIA (OUTPATIENT)
Dept: SURGERY | Facility: HOSPITAL | Age: 84
DRG: 159 | End: 2019-08-19
Payer: MEDICARE

## 2019-08-19 ENCOUNTER — APPOINTMENT (OUTPATIENT)
Dept: CT IMAGING | Facility: HOSPITAL | Age: 84
DRG: 159 | End: 2019-08-19
Attending: PHYSICIAN ASSISTANT
Payer: MEDICARE

## 2019-08-19 ENCOUNTER — ANESTHESIA EVENT (OUTPATIENT)
Dept: SURGERY | Facility: HOSPITAL | Age: 84
DRG: 159 | End: 2019-08-19
Payer: MEDICARE

## 2019-08-19 VITALS
SYSTOLIC BLOOD PRESSURE: 138 MMHG | TEMPERATURE: 97.9 F | HEIGHT: 73 IN | DIASTOLIC BLOOD PRESSURE: 77 MMHG | WEIGHT: 242.51 LBS | BODY MASS INDEX: 32.14 KG/M2 | OXYGEN SATURATION: 99 % | RESPIRATION RATE: 12 BRPM

## 2019-08-19 DIAGNOSIS — L02.11 ABSCESS OF NECK: ICD-10-CM

## 2019-08-19 PROBLEM — I10 BENIGN ESSENTIAL HYPERTENSION: Status: ACTIVE | Noted: 2019-08-19

## 2019-08-19 LAB
ANION GAP SERPL CALCULATED.3IONS-SCNC: 5 MMOL/L (ref 3–14)
BASOPHILS # BLD AUTO: 0.1 10E9/L (ref 0–0.2)
BASOPHILS NFR BLD AUTO: 0.4 %
BUN SERPL-MCNC: 9 MG/DL (ref 7–30)
CALCIUM SERPL-MCNC: 8.7 MG/DL (ref 8.5–10.1)
CHLORIDE SERPL-SCNC: 89 MMOL/L (ref 94–109)
CO2 SERPL-SCNC: 32 MMOL/L (ref 20–32)
CREAT SERPL-MCNC: 0.62 MG/DL (ref 0.66–1.25)
CRP SERPL-MCNC: 24.3 MG/L (ref 0–8)
DEPRECATED S PYO AG THROAT QL EIA: NORMAL
DIFFERENTIAL METHOD BLD: ABNORMAL
EOSINOPHIL # BLD AUTO: 0.2 10E9/L (ref 0–0.7)
EOSINOPHIL NFR BLD AUTO: 1.4 %
ERYTHROCYTE [DISTWIDTH] IN BLOOD BY AUTOMATED COUNT: 12.6 % (ref 10–15)
GFR SERPL CREATININE-BSD FRML MDRD: >90 ML/MIN/{1.73_M2}
GLUCOSE BLDC GLUCOMTR-MCNC: 226 MG/DL (ref 70–99)
GLUCOSE SERPL-MCNC: 192 MG/DL (ref 70–99)
HCT VFR BLD AUTO: 40.2 % (ref 40–53)
HGB BLD-MCNC: 14.2 G/DL (ref 13.3–17.7)
IMM GRANULOCYTES # BLD: 0.1 10E9/L (ref 0–0.4)
IMM GRANULOCYTES NFR BLD: 0.4 %
LYMPHOCYTES # BLD AUTO: 1.3 10E9/L (ref 0.8–5.3)
LYMPHOCYTES NFR BLD AUTO: 10.6 %
MCH RBC QN AUTO: 28 PG (ref 26.5–33)
MCHC RBC AUTO-ENTMCNC: 35.3 G/DL (ref 31.5–36.5)
MCV RBC AUTO: 79 FL (ref 78–100)
MONOCYTES # BLD AUTO: 0.9 10E9/L (ref 0–1.3)
MONOCYTES NFR BLD AUTO: 7.3 %
NEUTROPHILS # BLD AUTO: 9.9 10E9/L (ref 1.6–8.3)
NEUTROPHILS NFR BLD AUTO: 79.9 %
NRBC # BLD AUTO: 0 10*3/UL
NRBC BLD AUTO-RTO: 0 /100
PLATELET # BLD AUTO: 482 10E9/L (ref 150–450)
POTASSIUM SERPL-SCNC: 4.5 MMOL/L (ref 3.4–5.3)
RBC # BLD AUTO: 5.07 10E12/L (ref 4.4–5.9)
SODIUM SERPL-SCNC: 126 MMOL/L (ref 133–144)
SPECIMEN SOURCE: NORMAL
WBC # BLD AUTO: 12.4 10E9/L (ref 4–11)

## 2019-08-19 PROCEDURE — 31536 LARYNGOSCOPY W/BX & OP SCOPE: CPT | Performed by: OTOLARYNGOLOGY

## 2019-08-19 PROCEDURE — 25000128 H RX IP 250 OP 636: Performed by: FAMILY MEDICINE

## 2019-08-19 PROCEDURE — 40000986 XR CHEST PORT 1 VW: Mod: TC

## 2019-08-19 PROCEDURE — 25800030 ZZH RX IP 258 OP 636: Performed by: INTERNAL MEDICINE

## 2019-08-19 PROCEDURE — 31536 LARYNGOSCOPY W/BX & OP SCOPE: CPT | Performed by: ANESTHESIOLOGY

## 2019-08-19 PROCEDURE — 88305 TISSUE EXAM BY PATHOLOGIST: CPT | Mod: TC | Performed by: OTOLARYNGOLOGY

## 2019-08-19 PROCEDURE — 00000146 ZZHCL STATISTIC GLUCOSE BY METER IP

## 2019-08-19 PROCEDURE — 0CBM8ZX EXCISION OF PHARYNX, VIA NATURAL OR ARTIFICIAL OPENING ENDOSCOPIC, DIAGNOSTIC: ICD-10-PCS | Performed by: OTOLARYNGOLOGY

## 2019-08-19 PROCEDURE — 20000003 ZZH R&B ICU

## 2019-08-19 PROCEDURE — 37000008 ZZH ANESTHESIA TECHNICAL FEE, 1ST 30 MIN: Performed by: OTOLARYNGOLOGY

## 2019-08-19 PROCEDURE — 99222 1ST HOSP IP/OBS MODERATE 55: CPT | Mod: 25 | Performed by: OTOLARYNGOLOGY

## 2019-08-19 PROCEDURE — 86140 C-REACTIVE PROTEIN: CPT | Performed by: PHYSICIAN ASSISTANT

## 2019-08-19 PROCEDURE — 27110028 ZZH OR GENERAL SUPPLY NON-STERILE: Performed by: OTOLARYNGOLOGY

## 2019-08-19 PROCEDURE — 87880 STREP A ASSAY W/OPTIC: CPT | Performed by: FAMILY MEDICINE

## 2019-08-19 PROCEDURE — 80048 BASIC METABOLIC PNL TOTAL CA: CPT | Performed by: PHYSICIAN ASSISTANT

## 2019-08-19 PROCEDURE — 25000128 H RX IP 250 OP 636: Performed by: PHYSICIAN ASSISTANT

## 2019-08-19 PROCEDURE — 25000128 H RX IP 250 OP 636: Performed by: OTOLARYNGOLOGY

## 2019-08-19 PROCEDURE — 93005 ELECTROCARDIOGRAM TRACING: CPT

## 2019-08-19 PROCEDURE — 27210794 ZZH OR GENERAL SUPPLY STERILE: Performed by: OTOLARYNGOLOGY

## 2019-08-19 PROCEDURE — 99284 EMERGENCY DEPT VISIT MOD MDM: CPT | Mod: Z6 | Performed by: FAMILY MEDICINE

## 2019-08-19 PROCEDURE — 85025 COMPLETE CBC W/AUTO DIFF WBC: CPT | Performed by: PHYSICIAN ASSISTANT

## 2019-08-19 PROCEDURE — 40000306 ZZH STATISTIC PRE PROC ASSESS II: Performed by: OTOLARYNGOLOGY

## 2019-08-19 PROCEDURE — 70491 CT SOFT TISSUE NECK W/DYE: CPT | Mod: TC

## 2019-08-19 PROCEDURE — 25000125 ZZHC RX 250: Performed by: NURSE ANESTHETIST, CERTIFIED REGISTERED

## 2019-08-19 PROCEDURE — 25000125 ZZHC RX 250: Performed by: PHYSICIAN ASSISTANT

## 2019-08-19 PROCEDURE — 31536 LARYNGOSCOPY W/BX & OP SCOPE: CPT | Performed by: NURSE ANESTHETIST, CERTIFIED REGISTERED

## 2019-08-19 PROCEDURE — 25500064 ZZH RX 255 OP 636: Performed by: RADIOLOGY

## 2019-08-19 PROCEDURE — 94002 VENT MGMT INPAT INIT DAY: CPT

## 2019-08-19 PROCEDURE — 31575 DIAGNOSTIC LARYNGOSCOPY: CPT | Mod: 59 | Performed by: OTOLARYNGOLOGY

## 2019-08-19 PROCEDURE — 87081 CULTURE SCREEN ONLY: CPT | Performed by: FAMILY MEDICINE

## 2019-08-19 PROCEDURE — 25000128 H RX IP 250 OP 636: Performed by: NURSE ANESTHETIST, CERTIFIED REGISTERED

## 2019-08-19 PROCEDURE — 99140 ANES COMP EMERGENCY COND: CPT | Performed by: NURSE ANESTHETIST, CERTIFIED REGISTERED

## 2019-08-19 PROCEDURE — 96374 THER/PROPH/DIAG INJ IV PUSH: CPT

## 2019-08-19 PROCEDURE — 25800030 ZZH RX IP 258 OP 636: Performed by: NURSE ANESTHETIST, CERTIFIED REGISTERED

## 2019-08-19 PROCEDURE — 99285 EMERGENCY DEPT VISIT HI MDM: CPT | Mod: 25

## 2019-08-19 PROCEDURE — 36000058 ZZH SURGERY LEVEL 3 EA 15 ADDTL MIN: Performed by: OTOLARYNGOLOGY

## 2019-08-19 PROCEDURE — 36000056 ZZH SURGERY LEVEL 3 1ST 30 MIN: Performed by: OTOLARYNGOLOGY

## 2019-08-19 PROCEDURE — 31575 DIAGNOSTIC LARYNGOSCOPY: CPT

## 2019-08-19 PROCEDURE — 71046 X-RAY EXAM CHEST 2 VIEWS: CPT | Mod: TC

## 2019-08-19 PROCEDURE — 37000009 ZZH ANESTHESIA TECHNICAL FEE, EACH ADDTL 15 MIN: Performed by: OTOLARYNGOLOGY

## 2019-08-19 PROCEDURE — 99235 HOSP IP/OBS SAME DATE MOD 70: CPT | Performed by: INTERNAL MEDICINE

## 2019-08-19 PROCEDURE — 25000128 H RX IP 250 OP 636: Performed by: INTERNAL MEDICINE

## 2019-08-19 PROCEDURE — 99100 ANES PT EXTEME AGE<1 YR&>70: CPT | Performed by: NURSE ANESTHETIST, CERTIFIED REGISTERED

## 2019-08-19 PROCEDURE — 25000125 ZZHC RX 250: Performed by: OTOLARYNGOLOGY

## 2019-08-19 PROCEDURE — 40000275 ZZH STATISTIC RCP TIME EA 10 MIN

## 2019-08-19 PROCEDURE — 96375 TX/PRO/DX INJ NEW DRUG ADDON: CPT

## 2019-08-19 RX ORDER — HYDROMORPHONE HYDROCHLORIDE 1 MG/ML
.3-.5 INJECTION, SOLUTION INTRAMUSCULAR; INTRAVENOUS; SUBCUTANEOUS EVERY 5 MIN PRN
Status: DISCONTINUED | OUTPATIENT
Start: 2019-08-19 | End: 2019-08-19 | Stop reason: HOSPADM

## 2019-08-19 RX ORDER — HYDRALAZINE HYDROCHLORIDE 20 MG/ML
2.5-5 INJECTION INTRAMUSCULAR; INTRAVENOUS EVERY 10 MIN PRN
Status: DISCONTINUED | OUTPATIENT
Start: 2019-08-19 | End: 2019-08-19 | Stop reason: HOSPADM

## 2019-08-19 RX ORDER — HYDROMORPHONE HYDROCHLORIDE 1 MG/ML
.3-.5 INJECTION, SOLUTION INTRAMUSCULAR; INTRAVENOUS; SUBCUTANEOUS
Status: DISCONTINUED | OUTPATIENT
Start: 2019-08-19 | End: 2019-08-19 | Stop reason: HOSPADM

## 2019-08-19 RX ORDER — CHLORHEXIDINE GLUCONATE ORAL RINSE 1.2 MG/ML
15 SOLUTION DENTAL EVERY 12 HOURS
Status: DISCONTINUED | OUTPATIENT
Start: 2019-08-19 | End: 2019-08-19 | Stop reason: HOSPADM

## 2019-08-19 RX ORDER — LIDOCAINE 40 MG/G
CREAM TOPICAL
Status: DISCONTINUED | OUTPATIENT
Start: 2019-08-19 | End: 2019-08-19 | Stop reason: HOSPADM

## 2019-08-19 RX ORDER — ONDANSETRON 2 MG/ML
4 INJECTION INTRAMUSCULAR; INTRAVENOUS EVERY 6 HOURS PRN
Status: DISCONTINUED | OUTPATIENT
Start: 2019-08-19 | End: 2019-08-19 | Stop reason: HOSPADM

## 2019-08-19 RX ORDER — SODIUM CHLORIDE, SODIUM LACTATE, POTASSIUM CHLORIDE, CALCIUM CHLORIDE 600; 310; 30; 20 MG/100ML; MG/100ML; MG/100ML; MG/100ML
INJECTION, SOLUTION INTRAVENOUS CONTINUOUS
Status: DISCONTINUED | OUTPATIENT
Start: 2019-08-19 | End: 2019-08-19 | Stop reason: HOSPADM

## 2019-08-19 RX ORDER — IPRATROPIUM BROMIDE AND ALBUTEROL SULFATE 2.5; .5 MG/3ML; MG/3ML
3 SOLUTION RESPIRATORY (INHALATION) EVERY 4 HOURS PRN
Status: DISCONTINUED | OUTPATIENT
Start: 2019-08-19 | End: 2019-08-19 | Stop reason: HOSPADM

## 2019-08-19 RX ORDER — ONDANSETRON 4 MG/1
4 TABLET, ORALLY DISINTEGRATING ORAL EVERY 30 MIN PRN
Status: DISCONTINUED | OUTPATIENT
Start: 2019-08-19 | End: 2019-08-19 | Stop reason: HOSPADM

## 2019-08-19 RX ORDER — NALOXONE HYDROCHLORIDE 0.4 MG/ML
.1-.4 INJECTION, SOLUTION INTRAMUSCULAR; INTRAVENOUS; SUBCUTANEOUS
Status: DISCONTINUED | OUTPATIENT
Start: 2019-08-19 | End: 2019-08-19 | Stop reason: HOSPADM

## 2019-08-19 RX ORDER — DEXTROSE MONOHYDRATE 25 G/50ML
25-50 INJECTION, SOLUTION INTRAVENOUS
Status: DISCONTINUED | OUTPATIENT
Start: 2019-08-19 | End: 2019-08-19 | Stop reason: HOSPADM

## 2019-08-19 RX ORDER — NICOTINE POLACRILEX 4 MG
15-30 LOZENGE BUCCAL
Status: DISCONTINUED | OUTPATIENT
Start: 2019-08-19 | End: 2019-08-19 | Stop reason: HOSPADM

## 2019-08-19 RX ORDER — OXYMETAZOLINE HYDROCHLORIDE 0.05 G/100ML
2 SPRAY NASAL ONCE
Status: COMPLETED | OUTPATIENT
Start: 2019-08-19 | End: 2019-08-19

## 2019-08-19 RX ORDER — LIDOCAINE HYDROCHLORIDE AND EPINEPHRINE 10; 10 MG/ML; UG/ML
INJECTION, SOLUTION INFILTRATION; PERINEURAL PRN
Status: DISCONTINUED | OUTPATIENT
Start: 2019-08-19 | End: 2019-08-19 | Stop reason: HOSPADM

## 2019-08-19 RX ORDER — FENTANYL CITRATE 50 UG/ML
25 INJECTION, SOLUTION INTRAMUSCULAR; INTRAVENOUS ONCE
Status: COMPLETED | OUTPATIENT
Start: 2019-08-19 | End: 2019-08-19

## 2019-08-19 RX ORDER — IPRATROPIUM BROMIDE AND ALBUTEROL SULFATE 2.5; .5 MG/3ML; MG/3ML
3 SOLUTION RESPIRATORY (INHALATION) EVERY 4 HOURS PRN
Status: ON HOLD | DISCHARGE
Start: 2019-08-19 | End: 2022-02-02

## 2019-08-19 RX ORDER — ONDANSETRON 2 MG/ML
4 INJECTION INTRAMUSCULAR; INTRAVENOUS EVERY 6 HOURS PRN
Status: ON HOLD | DISCHARGE
Start: 2019-08-19 | End: 2022-02-02

## 2019-08-19 RX ORDER — PROPOFOL 10 MG/ML
10-20 INJECTION, EMULSION INTRAVENOUS EVERY 30 MIN PRN
Status: ON HOLD | DISCHARGE
Start: 2019-08-19 | End: 2022-02-02

## 2019-08-19 RX ORDER — FENTANYL CITRATE 50 UG/ML
INJECTION, SOLUTION INTRAMUSCULAR; INTRAVENOUS PRN
Status: DISCONTINUED | OUTPATIENT
Start: 2019-08-19 | End: 2019-08-19

## 2019-08-19 RX ORDER — LIDOCAINE HYDROCHLORIDE 20 MG/ML
INJECTION, SOLUTION INFILTRATION; PERINEURAL PRN
Status: DISCONTINUED | OUTPATIENT
Start: 2019-08-19 | End: 2019-08-19

## 2019-08-19 RX ORDER — EPINEPHRINE 1 MG/ML
INJECTION, SOLUTION INTRAMUSCULAR; SUBCUTANEOUS PRN
Status: DISCONTINUED | OUTPATIENT
Start: 2019-08-19 | End: 2019-08-19 | Stop reason: HOSPADM

## 2019-08-19 RX ORDER — PROPOFOL 10 MG/ML
5-75 INJECTION, EMULSION INTRAVENOUS CONTINUOUS
Status: DISCONTINUED | OUTPATIENT
Start: 2019-08-19 | End: 2019-08-19 | Stop reason: HOSPADM

## 2019-08-19 RX ORDER — ALBUTEROL SULFATE 0.83 MG/ML
2.5 SOLUTION RESPIRATORY (INHALATION) EVERY 4 HOURS PRN
Status: DISCONTINUED | OUTPATIENT
Start: 2019-08-19 | End: 2019-08-19 | Stop reason: HOSPADM

## 2019-08-19 RX ORDER — DEXTROSE MONOHYDRATE 25 G/50ML
25-50 INJECTION, SOLUTION INTRAVENOUS
Status: ON HOLD | DISCHARGE
Start: 2019-08-19 | End: 2022-02-02

## 2019-08-19 RX ORDER — CHLORHEXIDINE GLUCONATE ORAL RINSE 1.2 MG/ML
15 SOLUTION DENTAL EVERY 12 HOURS
Status: ON HOLD | DISCHARGE
Start: 2019-08-19 | End: 2022-02-02

## 2019-08-19 RX ORDER — FENTANYL CITRATE 50 UG/ML
25-50 INJECTION, SOLUTION INTRAMUSCULAR; INTRAVENOUS
Status: DISCONTINUED | OUTPATIENT
Start: 2019-08-19 | End: 2019-08-19 | Stop reason: HOSPADM

## 2019-08-19 RX ORDER — PROPOFOL 10 MG/ML
5-75 INJECTION, EMULSION INTRAVENOUS CONTINUOUS
Status: ON HOLD | COMMUNITY
Start: 2019-08-19 | End: 2022-02-02

## 2019-08-19 RX ORDER — PROPOFOL 10 MG/ML
INJECTION, EMULSION INTRAVENOUS PRN
Status: DISCONTINUED | OUTPATIENT
Start: 2019-08-19 | End: 2019-08-19

## 2019-08-19 RX ORDER — PHENYLEPHRINE HCL IN 0.9% NACL 1 MG/10 ML
SYRINGE (ML) INTRAVENOUS PRN
Status: DISCONTINUED | OUTPATIENT
Start: 2019-08-19 | End: 2019-08-19

## 2019-08-19 RX ORDER — NICOTINE POLACRILEX 4 MG
15-30 LOZENGE BUCCAL
Status: ON HOLD | DISCHARGE
Start: 2019-08-19 | End: 2022-02-02

## 2019-08-19 RX ORDER — PROPOFOL 10 MG/ML
10-20 INJECTION, EMULSION INTRAVENOUS EVERY 30 MIN PRN
Status: DISCONTINUED | OUTPATIENT
Start: 2019-08-19 | End: 2019-08-19 | Stop reason: HOSPADM

## 2019-08-19 RX ORDER — ONDANSETRON 4 MG/1
4 TABLET, ORALLY DISINTEGRATING ORAL EVERY 6 HOURS PRN
Status: DISCONTINUED | OUTPATIENT
Start: 2019-08-19 | End: 2019-08-19 | Stop reason: HOSPADM

## 2019-08-19 RX ORDER — SODIUM CHLORIDE 9 MG/ML
INJECTION, SOLUTION INTRAVENOUS CONTINUOUS
Status: DISCONTINUED | OUTPATIENT
Start: 2019-08-19 | End: 2019-08-19 | Stop reason: HOSPADM

## 2019-08-19 RX ORDER — ONDANSETRON 2 MG/ML
4 INJECTION INTRAMUSCULAR; INTRAVENOUS EVERY 30 MIN PRN
Status: DISCONTINUED | OUTPATIENT
Start: 2019-08-19 | End: 2019-08-19 | Stop reason: HOSPADM

## 2019-08-19 RX ORDER — LABETALOL 20 MG/4 ML (5 MG/ML) INTRAVENOUS SYRINGE
10
Status: DISCONTINUED | OUTPATIENT
Start: 2019-08-19 | End: 2019-08-19 | Stop reason: HOSPADM

## 2019-08-19 RX ORDER — DEXAMETHASONE SODIUM PHOSPHATE 10 MG/ML
12 INJECTION INTRAMUSCULAR; INTRAVENOUS ONCE
Status: COMPLETED | OUTPATIENT
Start: 2019-08-19 | End: 2019-08-19

## 2019-08-19 RX ORDER — PROPOFOL 10 MG/ML
INJECTION, EMULSION INTRAVENOUS
Status: DISCONTINUED
Start: 2019-08-19 | End: 2019-08-19 | Stop reason: HOSPADM

## 2019-08-19 RX ORDER — DEXAMETHASONE SODIUM PHOSPHATE 4 MG/ML
4 INJECTION, SOLUTION INTRA-ARTICULAR; INTRALESIONAL; INTRAMUSCULAR; INTRAVENOUS; SOFT TISSUE
Status: DISCONTINUED | OUTPATIENT
Start: 2019-08-19 | End: 2019-08-19 | Stop reason: HOSPADM

## 2019-08-19 RX ORDER — SODIUM CHLORIDE 9 MG/ML
INJECTION, SOLUTION INTRAVENOUS CONTINUOUS PRN
Status: DISCONTINUED | OUTPATIENT
Start: 2019-08-19 | End: 2019-08-19

## 2019-08-19 RX ADMIN — OXYMETAZOLINE HYDROCHLORIDE 2 SPRAY: 0.05 SPRAY NASAL at 11:48

## 2019-08-19 RX ADMIN — FENTANYL CITRATE 25 MCG: 50 INJECTION, SOLUTION INTRAMUSCULAR; INTRAVENOUS at 14:09

## 2019-08-19 RX ADMIN — PROPOFOL 40 MCG/KG/MIN: 10 INJECTION, EMULSION INTRAVENOUS at 18:02

## 2019-08-19 RX ADMIN — DEXAMETHASONE SODIUM PHOSPHATE 12 MG: 10 INJECTION INTRAMUSCULAR; INTRAVENOUS at 12:23

## 2019-08-19 RX ADMIN — PROPOFOL 50 MG: 10 INJECTION, EMULSION INTRAVENOUS at 15:35

## 2019-08-19 RX ADMIN — ROCURONIUM BROMIDE 10 MG: 10 INJECTION INTRAVENOUS at 15:30

## 2019-08-19 RX ADMIN — SODIUM CHLORIDE: 9 INJECTION, SOLUTION INTRAVENOUS at 14:45

## 2019-08-19 RX ADMIN — ROCURONIUM BROMIDE 30 MG: 10 INJECTION INTRAVENOUS at 16:37

## 2019-08-19 RX ADMIN — LIDOCAINE HYDROCHLORIDE 40 MG: 20 INJECTION, SOLUTION INFILTRATION; PERINEURAL at 15:31

## 2019-08-19 RX ADMIN — MIDAZOLAM 4 MG: 1 INJECTION INTRAMUSCULAR; INTRAVENOUS at 16:48

## 2019-08-19 RX ADMIN — PROPOFOL 150 MG: 10 INJECTION, EMULSION INTRAVENOUS at 15:31

## 2019-08-19 RX ADMIN — IOHEXOL 100 ML: 300 INJECTION, SOLUTION INTRAVENOUS at 10:52

## 2019-08-19 RX ADMIN — Medication 100 MG: at 15:31

## 2019-08-19 RX ADMIN — SODIUM CHLORIDE 500 ML: 9 INJECTION, SOLUTION INTRAVENOUS at 11:31

## 2019-08-19 RX ADMIN — SODIUM CHLORIDE: 9 INJECTION, SOLUTION INTRAVENOUS at 17:12

## 2019-08-19 RX ADMIN — FENTANYL CITRATE 100 MCG: 50 INJECTION, SOLUTION INTRAMUSCULAR; INTRAVENOUS at 15:47

## 2019-08-19 RX ADMIN — TAZOBACTAM SODIUM AND PIPERACILLIN SODIUM 4.5 G: 500; 4 INJECTION, SOLUTION INTRAVENOUS at 11:49

## 2019-08-19 RX ADMIN — Medication 100 MCG: at 16:42

## 2019-08-19 RX ADMIN — FENTANYL CITRATE 50 MCG: 50 INJECTION, SOLUTION INTRAMUSCULAR; INTRAVENOUS at 16:10

## 2019-08-19 RX ADMIN — Medication 200 MCG: at 16:43

## 2019-08-19 RX ADMIN — FENTANYL CITRATE 100 MCG: 50 INJECTION, SOLUTION INTRAMUSCULAR; INTRAVENOUS at 16:48

## 2019-08-19 RX ADMIN — ROCURONIUM BROMIDE 30 MG: 10 INJECTION INTRAVENOUS at 15:39

## 2019-08-19 RX ADMIN — FENTANYL CITRATE 50 MCG: 50 INJECTION, SOLUTION INTRAMUSCULAR; INTRAVENOUS at 16:21

## 2019-08-19 RX ADMIN — FENTANYL CITRATE 100 MCG: 50 INJECTION, SOLUTION INTRAMUSCULAR; INTRAVENOUS at 15:30

## 2019-08-19 ASSESSMENT — ENCOUNTER SYMPTOMS
FEVER: 0
TROUBLE SWALLOWING: 1
MUSCULOSKELETAL NEGATIVE: 1
SORE THROAT: 1
CHOKING: 0
WOUND: 0
HEADACHES: 0
COUGH: 1
VOMITING: 0
CARDIOVASCULAR NEGATIVE: 1
NAUSEA: 0
CONFUSION: 0
VOICE CHANGE: 1
CHILLS: 0
SHORTNESS OF BREATH: 0
FATIGUE: 1
CHEST TIGHTNESS: 0
APPETITE CHANGE: 1
ABDOMINAL PAIN: 0
FATIGUE: 0

## 2019-08-19 ASSESSMENT — LIFESTYLE VARIABLES: TOBACCO_USE: 1

## 2019-08-19 ASSESSMENT — MIFFLIN-ST. JEOR: SCORE: 1858.88

## 2019-08-19 NOTE — ED PROVIDER NOTES
History     Chief Complaint   Patient presents with     Pharyngitis     HPI  Urbna Huizar is a 83 year old male who transferred over from urgent care to the emergency room because of difficulty swallowing and a muffled voice.  Patient states that he is had worsening over the last 2 weeks.  He is gone to the point where he cannot eat solid foods because he feels like he is going to choke.  He has been able to swallow.  Denies any fever or chills.  It got progressively worse about 4 5 days ago.  Mild cough.  Very little appetite and spitting up saliva.    Allergies:  No Known Allergies    Problem List:    Patient Active Problem List    Diagnosis Date Noted     Abscess of neck 08/19/2019     Priority: Medium     Type 2 diabetes mellitus without complication, without long-term current use of insulin (H) 08/19/2019     Priority: Medium     Benign essential hypertension 08/19/2019     Priority: Medium     Hyponatremia 08/19/2019     Priority: Medium        Past Medical History:    No past medical history on file.     Patient has a past history of diabetes but has not checked his sugars in over a year.  He states that he was told he had gout but he does not take his medication for that.  His present medication list is as noted in the chart.      Past Surgical History:    Past Surgical History:   Procedure Laterality Date     PHACOEMULSIFICATION WITH STANDARD INTRAOCULAR LENS IMPLANT Left 2/26/2018    Procedure: PHACOEMULSIFICATION WITH STANDARD INTRAOCULAR LENS IMPLANT;  PHACOEMULSIFICATION CATARACT EXTRACTION POSTERIOR CHAMBER LENS LEFT;  Surgeon: Baljinder Shannon MD;  Location: [facility] OR       Family History:    No family history on file.      Social History:  Marital Status:   [2]  Social History     Tobacco Use     Smoking status: Former Smoker     Packs/day: 4.00     Years: 30.00     Pack years: 120.00     Types: Cigarettes     Start date: 2/26/1946     Last attempt to quit: 2/26/1977     Years since  quittin.5     Smokeless tobacco: Never Used   Substance Use Topics     Alcohol use: Yes     Comment: rarely     Drug use: No        Medications:      ALLOPURINOL PO   Ascorbic Acid (VITAMIN C PO)   GLIPIZIDE PO   GLUCOSAMINE SULFATE PO   METFORMIN HCL PO   omega-3 acid ethyl esters (LOVAZA) 1 G capsule   PIOGLITAZONE HCL PO   UNABLE TO FIND   UNABLE TO FIND   VITAMIN D, CHOLECALCIFEROL, PO   VITAMIN E NATURAL PO         Review of Systems   Constitutional: Positive for appetite change. Negative for chills, fatigue and fever.   HENT: Positive for ear pain, sore throat and trouble swallowing. Negative for dental problem.    Respiratory: Positive for cough. Negative for choking, chest tightness and shortness of breath.    Cardiovascular: Negative.    Genitourinary: Negative.    Musculoskeletal: Negative.    Neurological: Negative for headaches.       Physical Exam   BP: 162/83  Heart Rate: 62  Temp: 96.8  F (36  C)  Resp: 18  SpO2: 97 %      Physical Exam   Constitutional: He is oriented to person, place, and time. He appears well-developed and well-nourished.  Non-toxic appearance. He does not appear ill. No distress.   HENT:   Head: Normocephalic and atraumatic.   Right Ear: Hearing and tympanic membrane normal. No swelling or tenderness.   Left Ear: Hearing and tympanic membrane normal. No swelling or tenderness.   Mouth/Throat: Uvula is midline, oropharynx is clear and moist and mucous membranes are normal.   NO tonsils noted. Tongue appears lifted up posteriorly   Eyes: Pupils are equal, round, and reactive to light. EOM are normal.   Neck: Normal range of motion. Neck supple.   Cardiovascular: Normal rate, regular rhythm and normal heart sounds.   Pulmonary/Chest: Effort normal and breath sounds normal.   Abdominal: Soft. Bowel sounds are normal. He exhibits no distension. There is no tenderness.   Neurological: He is alert and oriented to person, place, and time.   Skin: Skin is warm and dry. Capillary  refill takes less than 2 seconds.   Psychiatric: He has a normal mood and affect. His behavior is normal.   Nursing note and vitals reviewed.      ED Course     ED Course as of Aug 19 1402   Mon Aug 19, 2019   1050 128 corrected sodium for hyperglycemia      1113 Abscess right under tongue above hyoid; may be infected thyroglossal duct cyst; 2.7 cm      1131 Has not smoked in 40 years      1132 Spray with afrin nostril 2 sprays every 5 minutes x 2         Patient seen and examined.  ENT called and will be here to see patient.  Patient seen by ENT.  IR called and are unable to do interventional radiology to drain this abscess.  ENT will be taken to the OR at 3 PM this afternoon.  Hospitalist called.  Dr. Chapman accepted patient for admission.    1:55 PM Patient requested pain medication- will give low dose of Fentanyl. PAtient understands that we need to be careful because of his airway.     Results for orders placed or performed during the hospital encounter of 08/19/19 (from the past 24 hour(s))   Rapid strep screen   Result Value Ref Range    Specimen Description Throat     Rapid Strep A Screen       NEGATIVE: No Group A streptococcal antigen detected by immunoassay, await culture report.   CBC with platelets differential   Result Value Ref Range    WBC 12.4 (H) 4.0 - 11.0 10e9/L    RBC Count 5.07 4.4 - 5.9 10e12/L    Hemoglobin 14.2 13.3 - 17.7 g/dL    Hematocrit 40.2 40.0 - 53.0 %    MCV 79 78 - 100 fl    MCH 28.0 26.5 - 33.0 pg    MCHC 35.3 31.5 - 36.5 g/dL    RDW 12.6 10.0 - 15.0 %    Platelet Count 482 (H) 150 - 450 10e9/L    Diff Method Automated Method     % Neutrophils 79.9 %    % Lymphocytes 10.6 %    % Monocytes 7.3 %    % Eosinophils 1.4 %    % Basophils 0.4 %    % Immature Granulocytes 0.4 %    Nucleated RBCs 0 0 /100    Absolute Neutrophil 9.9 (H) 1.6 - 8.3 10e9/L    Absolute Lymphocytes 1.3 0.8 - 5.3 10e9/L    Absolute Monocytes 0.9 0.0 - 1.3 10e9/L    Absolute Eosinophils 0.2 0.0 - 0.7 10e9/L     Absolute Basophils 0.1 0.0 - 0.2 10e9/L    Abs Immature Granulocytes 0.1 0 - 0.4 10e9/L    Absolute Nucleated RBC 0.0    Basic metabolic panel   Result Value Ref Range    Sodium 126 (L) 133 - 144 mmol/L    Potassium 4.5 3.4 - 5.3 mmol/L    Chloride 89 (L) 94 - 109 mmol/L    Carbon Dioxide 32 20 - 32 mmol/L    Anion Gap 5 3 - 14 mmol/L    Glucose 192 (H) 70 - 99 mg/dL    Urea Nitrogen 9 7 - 30 mg/dL    Creatinine 0.62 (L) 0.66 - 1.25 mg/dL    GFR Estimate >90 >60 mL/min/[1.73_m2]    GFR Estimate If Black >90 >60 mL/min/[1.73_m2]    Calcium 8.7 8.5 - 10.1 mg/dL   CRP inflammation   Result Value Ref Range    CRP Inflammation 24.3 (H) 0.0 - 8.0 mg/L   Soft tissue neck CT w contrast    Narrative    CT SOFT TISSUE NECK W CONTRAST    HISTORY: 83 yearsMale Sore throat/stridor, epiglottis or tonsillitis  suspected    TECHNIQUE: Axial CT imaging of the neck was performed with intervenous  contrast. Coronal and sagittal reconstructions were obtained.    COMPARISON: None    FINDINGS: There is a fluid collection with irregular enhancing walls  at the midline of the neck, at the tongue base and anterior to the  vallecula. This fluid collection measures 2.7 x 2.7 x 2.3 cm in  dimension. It is located just above the hyoid.    The parotid glands submandibular glands and thyroid are unremarkable.    There is thickening of the epiglottis. There is no evidence of  lymphadenopathy or mass otherwise within the neck.      Impression    IMPRESSION: There is a midline abscess within the suprahyoid neck,  anterior to the vallecula at the tongue base. The abscess measures 2.7  x 2.7 x 2.3 cm in dimension. Location could be compatible with an  infected thyroglossal duct cyst.    There is thickening of the epiglottis, possibly reactive in nature.  Epiglottitis or cannot be excluded.    BRIAN CHADWICK MD   Chest XR,  PA & LAT    Narrative    XR CHEST 2 VW    HISTORY: 83 years Male cough x 2 weeks    COMPARISON: None    TECHNIQUE: 2 views of  the chest were obtained.    FINDINGS: Heart size and pulmonary vascularity are within normal  limits. There is subtle airspace opacity at the left lung base. The  lungs are otherwise clear.    There is mild interstitial prominence.      Impression    IMPRESSION: Subtle left basilar airspace opacity. Question early or  mild pneumonia versus atelectasis.    BRIAN CHADWICK MD       Medications   fentaNYL (PF) (SUBLIMAZE) injection 25 mcg (has no administration in time range)   sodium chloride (PF) 0.9% PF flush 60 mL (60 mLs Intravenous Given 8/19/19 1053)   iohexol (OMNIPAQUE) 300 mg/mL injection 100 mL (100 mLs Intravenous Given 8/19/19 1052)   0.9% sodium chloride BOLUS (0 mLs Intravenous Stopped 8/19/19 1224)   piperacillin-tazobactam (ZOSYN) intermittent infusion 4.5 g (0 g Intravenous Stopped 8/19/19 1224)   oxymetazoline (AFRIN) 0.05 % spray 2 spray (2 sprays Both Nostrils Given 8/19/19 1148)   dexamethasone (DECADRON) injection 12 mg (12 mg Intravenous Given 8/19/19 1223)       Assessments & Plan (with Medical Decision Making)     I have reviewed the nursing notes.    I have reviewed the findings, diagnosis, plan and need for follow up with the patient.    New Prescriptions    No medications on file       Final diagnoses:   Abscess of neck       8/19/2019   [facility]     Jb Temple MD  08/19/19 5377

## 2019-08-19 NOTE — CONSULTS
Otolaryngology Consultation    Patient: Urban Huizar  : 1935    CC:  odynophagia    HPI:  Urban Huizar is a 83 year old male seen today for 2 weeks of a progressive sore throat, difficulty swallowing tongue pain and otalgia.  He denied previous upper respiratory tract infection or any recent dental work no prior, he is edentulous.  He has not had any prior events similar to this and denies any recurrent swelling in the midline neck.  Quit tobacco over 40 years ago and is retired .    He has been NPO today aside from sip of black coffee.    There is been no weight loss fever chills or night sweats.        Current Outpatient Rx   Medication Sig Dispense Refill     ALLOPURINOL PO Take 200 mg by mouth daily        Ascorbic Acid (VITAMIN C PO) Take 1,000 mg by mouth 2 times daily        GLIPIZIDE PO Take 10 mg by mouth Take 2 tablets by mouth every day and take one tablet every evening to decrease blood sugar.  Take 30 minutes before meal.       GLUCOSAMINE SULFATE PO Take 2,000 mg by mouth 2 times daily        METFORMIN HCL PO Take 1,000 mg by mouth 2 times daily (with meals)       omega-3 acid ethyl esters (LOVAZA) 1 G capsule Take 1,200 mg by mouth 2 times daily        PIOGLITAZONE HCL PO Take 45 mg by mouth daily        UNABLE TO FIND 1,000 mg daily Cinnamon        UNABLE TO FIND 500 mg daily Tumeric        VITAMIN D, CHOLECALCIFEROL, PO Take 5,000 Units by mouth daily       VITAMIN E NATURAL PO Take 1,000 Units by mouth         Allergies: Patient has no known allergies.     No past medical history on file.    Past Surgical History:   Procedure Laterality Date     PHACOEMULSIFICATION WITH STANDARD INTRAOCULAR LENS IMPLANT Left 2018    Procedure: PHACOEMULSIFICATION WITH STANDARD INTRAOCULAR LENS IMPLANT;  PHACOEMULSIFICATION CATARACT EXTRACTION POSTERIOR CHAMBER LENS LEFT;  Surgeon: Baljinder Shannon MD;  Location: [facility]       ENT family history reviewed    Social History     Tobacco Use      Smoking status: Former Smoker     Packs/day: 4.00     Years: 30.00     Pack years: 120.00     Types: Cigarettes     Start date: 1946     Last attempt to quit: 1977     Years since quittin.5     Smokeless tobacco: Never Used   Substance Use Topics     Alcohol use: Yes     Comment: rarely     Drug use: No       Review of Systems  ROS: 10 point ROS neg other than the symptoms noted above in the HPI and joint pain    Physical Exam  /83   Temp 96.8  F (36  C) (Tympanic)   Resp 18   SpO2 97%   General - The patient is well nourished and well developed, and appears to have good nutritional status.  Alert and oriented to person and place, answers questions and cooperates with examination appropriately.   Head and Face - Normocephalic and atraumatic, with no gross asymmetry noted.  The facial nerve is intact, with strong symmetric movements.  Voice and Breathing - The patient was breathing comfortably without the use of accessory muscles. There was no wheezing, stridor, or stertor.  The patients voice was muffled, no  drooling no trismus no stridor   Ears -The external auditory canals are patent, the tympanic membranes are intact , no erythema or bulging  Eyes - Extraocular movements intact, and the pupils were reactive to light.  Sclera were not icteric or injected, conjunctiva were pink and moist.  Mouth - Examination of the oral cavity showed pink, healthy oral mucosa. No lesions or ulcerations noted.  The tongue was mobile and midline, and edentulous.  Bimanual palpation reveals a soft floor the mouth  Throat - The walls of the oropharynx were smooth, pink, moist, symmetric, and had no lesions or ulcerations.  The tonsillar pillars and soft palate were symmetric.  The uvula was midline on elevation.  Erythema extends from the midline tongue to the tongue base and he is exquisitely tender to palpation this area is full there is no exophytic mass  Neck - No palpable enlarged fixed cervical lymph  nodes.  No neck cysts or unusual tenderness to palpation.   No palpable fixed thyroid nodules or concerning goiter.  The trachea is grossly midline.  No midline neck mass or swelling  Nose - External contour is symmetric, no gross deflection or scars.  Nasal mucosa is pink and moist with no abnormal mucus.  No polyps, masses, or purulence noted on examination.    Attempts at mirror laryngoscopy were not possible due to gag reflex.  Therefore I proceeded with a fiberoptic examination after informed consent.  First I sprayed both sides of the nose with a mixture of lidocaine and neosynephrine.  I then passed the scope through the nasal cavity.     The nasopharynx was mucosally covered and symmetric.  The eustachian tube openings were unobstructed.  Going further down I had a clear view of the base of tongue which had fullness at the midline tongue base and erythema no barb purulence.  The vallecula was  obstructed by fullness of the tongue base The epiglottis was smooth and mucosally covered and mildly erythematous and edematous.  The supraglottic larynx was then clearly visualized.  The vocal cords moved smoothly and symmetrically and were pearly white.  The pyriform sinuses were open and without barb mass or pooling of secretions upon valsalva, and the limited view of the postcricoid region did not show any lesions.  The patient tolerated the procedure well.        IMAGING    CT neck was personally reviewed today's date.  There is an enhancing cystic well-defined mass just anterior to the epiglottis obstructing the vallecula at the base of the tongue the mass is just posterior to the hyoid.  There is no cervical lymphadenopathy.  The epiglottis is thickened                                                                 IMPRESSION: There is a midline abscess within the suprahyoid neck,  anterior to the vallecula at the tongue base. The abscess measures 2.7  x 2.7 x 2.3 cm in dimension. Location could be compatible  with an  infected thyroglossal duct cyst.      Impression and Plan- Urban Huizar is a 83 year old male with:  1.  Tongue base abscess, suspicious for thyroglossal duct cyst     This visit was in complications of incision and drainage central tongue abscess including anesthesia bleeding infection possible injury to the hypoglossal nerve with permanent tongue weakness speech and swallowing problems or need for further surgery.  If this is a thyroglossal duct cyst he may eventually need a formal Sistrunk procedure.  I told Urban I would recommend admission overnight and to remain intubated because he does have mild epiglottic edema and I will approach this transoral using endolaryngeal instruments.  He and Sheila, his wife, have no further questions.    NPO  Continue IV zosyn and IV decadron 12 mg every day, he has received full dose today  Decadron administered  Admit to hospitalist, expect extubation tomorrow and discharge within 48 hours        Madie Lew D.O.  Otolaryngology/Head and Neck Surgery  Allergy

## 2019-08-19 NOTE — PLAN OF CARE
Pt up from surgery at approx 1650, remains intubated. RT and MD at bedside to adjust vent settings. Lungs clear equal bilaterally. Scant in line secretions, small amount of pink tinged oral secretions. BPs stable. Normal sinus rhythm 70s. Afebrile. Propofol gtt initiated at 40mcg/kg/min. No restraints in place, pt not awake or pulling at lines/tubes at this time. Antonio catheter placed, 500ml urine out. Pt left facility at 1930 with [email or web address], placed on their ventilator, propofol gtt d/c'd. Report called to Dora ALMEIDA at [facility].

## 2019-08-19 NOTE — LETTER
9/3/2019     Urban Huizar  [address] [place] [address]      Dear Urban:    Thank you for allowing me to participate in your care. Your recent test results were reviewed and listed below.      Your results are provided below for your review  Results for orders placed or performed during the hospital encounter of 08/19/19   Soft tissue neck CT w contrast    Narrative    CT SOFT TISSUE NECK W CONTRAST    HISTORY: 83 yearsMale Sore throat/stridor, epiglottis or tonsillitis  suspected    TECHNIQUE: Axial CT imaging of the neck was performed with intervenous  contrast. Coronal and sagittal reconstructions were obtained.    COMPARISON: None    FINDINGS: There is a fluid collection with irregular enhancing walls  at the midline of the neck, at the tongue base and anterior to the  vallecula. This fluid collection measures 2.7 x 2.7 x 2.3 cm in  dimension. It is located just above the hyoid.    The parotid glands submandibular glands and thyroid are unremarkable.    There is thickening of the epiglottis. There is no evidence of  lymphadenopathy or mass otherwise within the neck.      Impression    IMPRESSION: There is a midline abscess within the suprahyoid neck,  anterior to the vallecula at the tongue base. The abscess measures 2.7  x 2.7 x 2.3 cm in dimension. Location could be compatible with an  infected thyroglossal duct cyst.    There is thickening of the epiglottis, possibly reactive in nature.  Epiglottitis or cannot be excluded.    BRIAN CHADWICK MD   Chest XR,  PA & LAT    Narrative    XR CHEST 2 VW    HISTORY: 83 years Male cough x 2 weeks    COMPARISON: None    TECHNIQUE: 2 views of the chest were obtained.    FINDINGS: Heart size and pulmonary vascularity are within normal  limits. There is subtle airspace opacity at the left lung base. The  lungs are otherwise clear.    There is mild interstitial prominence.      Impression    IMPRESSION: Subtle left basilar airspace  opacity. Question early or  mild pneumonia versus atelectasis.    BRIAN CHADWICK MD   XR Chest Port 1 View    Narrative    PROCEDURE:  XR CHEST PORT 1 VW    HISTORY: evaluate ETT. .    COMPARISON:  Radiographs earlier today.    FINDINGS:  Intubated, with endotracheal tube at the lower margin of the  clavicular heads approximately 5 cm above the eulogio in appropriate  position.  The cardiomediastinal contours are grossly unchanged. There is  calcific aortic atherosclerosis.   Interstitial markings are thickened. Lung volumes are slightly low. No  pneumothorax is seen.      Impression    IMPRESSION:  Intubated.    Interstitial thickening may reflect low lung volumes or developing  pulmonary edema. Recommend short interval follow-up.      NADIA BARON MD   CBC with platelets differential   Result Value Ref Range    WBC 12.4 (H) 4.0 - 11.0 10e9/L    RBC Count 5.07 4.4 - 5.9 10e12/L    Hemoglobin 14.2 13.3 - 17.7 g/dL    Hematocrit 40.2 40.0 - 53.0 %    MCV 79 78 - 100 fl    MCH 28.0 26.5 - 33.0 pg    MCHC 35.3 31.5 - 36.5 g/dL    RDW 12.6 10.0 - 15.0 %    Platelet Count 482 (H) 150 - 450 10e9/L    Diff Method Automated Method     % Neutrophils 79.9 %    % Lymphocytes 10.6 %    % Monocytes 7.3 %    % Eosinophils 1.4 %    % Basophils 0.4 %    % Immature Granulocytes 0.4 %    Nucleated RBCs 0 0 /100    Absolute Neutrophil 9.9 (H) 1.6 - 8.3 10e9/L    Absolute Lymphocytes 1.3 0.8 - 5.3 10e9/L    Absolute Monocytes 0.9 0.0 - 1.3 10e9/L    Absolute Eosinophils 0.2 0.0 - 0.7 10e9/L    Absolute Basophils 0.1 0.0 - 0.2 10e9/L    Abs Immature Granulocytes 0.1 0 - 0.4 10e9/L    Absolute Nucleated RBC 0.0    Basic metabolic panel   Result Value Ref Range    Sodium 126 (L) 133 - 144 mmol/L    Potassium 4.5 3.4 - 5.3 mmol/L    Chloride 89 (L) 94 - 109 mmol/L    Carbon Dioxide 32 20 - 32 mmol/L    Anion Gap 5 3 - 14 mmol/L    Glucose 192 (H) 70 - 99 mg/dL    Urea Nitrogen 9 7 - 30 mg/dL    Creatinine 0.62 (L) 0.66 - 1.25  mg/dL    GFR Estimate >90 >60 mL/min/[1.73_m2]    GFR Estimate If Black >90 >60 mL/min/[1.73_m2]    Calcium 8.7 8.5 - 10.1 mg/dL   CRP inflammation   Result Value Ref Range    CRP Inflammation 24.3 (H) 0.0 - 8.0 mg/L   Surgical pathology exam   Result Value Ref Range    Copath Report       Patient Name: DIOGENES SCHULZ  MR#: [id number]  Specimen #: [id number]  Collected: 8/19/2019  Received: 8/20/2019  Reported: 8/21/2019 13:04  Ordering Phy(s): AMMY ABREU  Copy To: [facility]  Fax: [phone]    For improved result formatting, select 'View Enhanced Report Format' under   Linked Documents section.    SPECIMEN(S):  A: Tongue biopsy, base  B: Tongue base, aspirate    FINAL DIAGNOSIS:  A: Tongue base, biopsy  - Focal mild acutely inflamed squamous mucosa    B: Tongue base, aspirate  - Blood    I have personally reviewed all specimens and/or slides, including the   listed special stains, and used them  with my medical judgement to determine or confirm the final diagnosis.    Electronically signed out by:    Benoit Mata M.D.    CLINICAL HISTORY:  Tongue abscess; incision and drainage central tongue abscess, direct   laryngoscopy.    GROSS:  A: There are three pieces of tan soft tissue which are 5 x 5 x 4 mm in   aggregate. (TE in 1 block).    B: There  are fragments of tan soft tissue which are approximately 5 x 5 x   1 mm in aggregate. (TE in 1 block).  (Dictated by: Benoit Mata MD 8/20/2019 02:50 PM)    MICROSCOPIC:  A: There is squamous mucosa with focal mild inflammation with mild   vascular dilation and increased numbers of  neutrophils within the subepithelium.    B: There is blood.    CPT Codes  A: 62091-NK6  B: 24299-YN3    COLLECTION SITE:  Client: [facility]  Location: [facility] (B)    The technical component of this testing was completed at the [facility], with the  professional component performed at the [facility], [address] [place] [address]  [address] [place] [id number]  ([phone])       Glucose by meter   Result Value Ref Range    Glucose 226 (H) 70 - 99 mg/dL   Rapid strep screen   Result Value Ref Range    Specimen Description Throat     Rapid Strep A Screen       NEGATIVE: No Group A streptococcal antigen detected by immunoassay, await culture report.   Beta strep group A culture   Result Value Ref Range    Specimen Description Throat     Culture Micro No beta hemolytic Streptococcus Group A isolated               Thank you for choosing [facility]. As a result, please continue with the treatment plan discussed in the office. Return as discussed or sooner if symptoms worsens or fail to improve. If you have any further questions or concerns, please do not hesitate to contact us.      Sincerely,    Madie Lew, DO  ENT Department  [address] [place] [address]  Phone: [phone]  Fax: [phone]

## 2019-08-19 NOTE — ANESTHESIA CARE TRANSFER NOTE
Patient: Urban Huizar    Procedure(s):  INCISION AND DRAINAGE CENTRAL TONGUE ABSCESS Direct Laryngoscopy    Diagnosis: TONGUE ABSCESS  Diagnosis Additional Information: No value filed.    Anesthesia Type:   General, ETT, RSI     Note:  Airway :ETT  Patient transferred to:ICU  ICU Handoff: Call for PAUSE to initiate/utilize ICU HANDOFF, Identified Patient, Identified Responsible Provider, Reviewed the Pertinent Medical History, Discussed Surgical Course, Reviewed Intra-OP Anesthesia Management and Issues during Anesthesia, Set Expectations for Post Procedure Period and Allowed Opportunity for Questions and Acknowledgement of Understanding      Vitals: (Last set prior to Anesthesia Care Transfer)    CRNA VITALS  8/19/2019 1617 - 8/19/2019 1717      8/19/2019             Resp Rate (observed):  8    Resp Rate (set):  8                Electronically Signed By: RAYMUNDO Alejo CRNA  August 19, 2019  5:28 PM

## 2019-08-19 NOTE — OP NOTE
Otolaryngology Operative Note     Pre-op Diagnosis: tongue base cyst or abscess  Post-op Diagnosis:  same  Procedure:  1.  Microdirect laryngoscopy with biopsies  Surgeon:  Madie Lew D.O.  Anesthesia:  General endotracheal  EBL:  10 ml  Findings: palpable cystic mass at distal tongue base just anterior to epiglottis, no fungating mass  severe aryepiglottic edema and moderate epiglottic edema  Complications:  none  Condition:  stable     Description of the Procedure  After surgical consent was obtained, the patient was brought to the operating room and laid in control in spine position.  He was administered a general anesthetic by member of anesthesia and intubation was performed without difficulty.    He was draped in the normal clean fashion a timeout was taken    I first palpated the tongue base and appreciated a 2.5 cm cyst.  There is mild erythema of the tongue base but no fungating mass.    I then placed a wet 4 x 4 to protect the gingiva he is edentulous.  I advanced the laryngoscope to the tongue base there is fullness along the tongue base without mass.  There is severe aryepiglottic edema and moderate epiglottic edema.  The false vocal cords have mild edema of the true cords are symmetric and without edema or mass.  The piriform sinuses are grossly clear in the postcricoid area is without mass.  I then directed the scope to the tongue base.   I anesthetized the tongue base with 1% lidocaine with 1-100,000 of epinephrine using an endolaryngeal needle.  The mucosa at the tongue base was irregular and biopsied under microscopy with cup forceps.    I then used the endo needle to attempt aspiration of the tongue base cyst without success.  The laryngoscope was removed the 4 x 4 was removed.  I then placed 2-0 silk retraction sutures into the tongue and retracted the tongue.  Sweetheart retractors were placed I can palpate at the cyst and by palpation, again attempted aspiration without success.   The area was too deep to approach with a 15 blade.    The area was irrigated and gently suctioned hemostasis is adequate.  The retraction sutures were removed  All counts were correct  The patient was handed back over to anesthesia and kept intubated.  He was transferred up to the ICU and will await transfer tonight to [facility] where interventional radiology has agreed to evaluate Urban for planned aspiration tomorrow.  I spoke with Dr. Angel, IR physician.  I spoke with  Dr. Torres medical/critical care at [facility] who accepted the patient.   I spoke with Dr. Chapman, hospitalist, who will arrange the transfer.   I spoke with Urban's wife and daughter and they are aware of the reason for transfer, all questions were answered.

## 2019-08-19 NOTE — PROGRESS NOTES
Patient returned from the OR to the ICU intubated.  Hemodynamically stable.  Blood pressures 112/56 O2 sats 98% heart rates in the 70s afebrile.  He is sedated currently on propofol.    Per verbal report from Dr. Lew she was unable to reach the suspected abscess.  Per anesthesia there is significant amount of epiglottic edema at the time of intubation.    Dr. Lew has spoke with [facility] critical care unit Dr. Torres was accepted the patient in transfer.  He has received the Decadron 12 mg and IV Zosyn 4.5 g both those were at roughly 1230 today.  He is also on the propofol drip.  He will be sent via ground ambulance to the [facility] critical care unit..

## 2019-08-19 NOTE — ANESTHESIA PREPROCEDURE EVALUATION
Anesthesia Pre-Procedure Evaluation    Patient: Urban Huizar   MRN: [id number] : 1935          Preoperative Diagnosis: TONGUE ABSCESS    Procedure(s):  INCISION AND DRAINAGE CENTRAL TONGUE ABSCESS    No past medical history on file.  Past Surgical History:   Procedure Laterality Date     PHACOEMULSIFICATION WITH STANDARD INTRAOCULAR LENS IMPLANT Left 2018    Procedure: PHACOEMULSIFICATION WITH STANDARD INTRAOCULAR LENS IMPLANT;  PHACOEMULSIFICATION CATARACT EXTRACTION POSTERIOR CHAMBER LENS LEFT;  Surgeon: Baljinder Shannon MD;  Location: [facility] OR       Anesthesia Evaluation     . Pt has had prior anesthetic.     No history of anesthetic complications          ROS/MED HX    ENT/Pulmonary: Comment: Chest x-ray shows a questionable left minimal infiltrate, though it is really not very visible    (+)tobacco use, Past use , . .    Neurologic: Comment: left subdural hematoma that was drained in  with antionette holes    (+)CVA date:  with deficits- minor speech impediment and a little bit of stuttering and word finding difficulty,     Cardiovascular:     (+) Dyslipidemia, hypertension----. : . . . :. .       METS/Exercise Tolerance:     Hematologic:         Musculoskeletal: Comment: gout  (+) arthritis,  other musculoskeletal- Gout      GI/Hepatic:         Renal/Genitourinary:     (+) Other Renal/ Genitourinary, Hyponatremia (Na: 126)      Endo:     (+) type II DM Last HgA1c: 6.9 date: 2017 .      Psychiatric:  - neg psychiatric ROS       Infectious Disease:  - neg infectious disease ROS       Malignancy:      - no malignancy   Other:    - neg other ROS                      Physical Exam  Normal systems: cardiovascular and pulmonary    Airway   Mallampati: III  TM distance: >3 FB  Neck ROM: full    Dental   (+) upper dentures, lower dentures and missing    Cardiovascular   Rhythm and rate: regular and normal      Pulmonary    breath sounds clear to auscultation            Lab Results   Component Value  "Date    WBC 12.4 (H) 08/19/2019    HGB 14.2 08/19/2019    HCT 40.2 08/19/2019     (H) 08/19/2019    CRP 24.3 (H) 08/19/2019    SED 8 05/01/2017     (L) 08/19/2019    POTASSIUM 4.5 08/19/2019    CHLORIDE 89 (L) 08/19/2019    CO2 32 08/19/2019    BUN 9 08/19/2019    CR 0.62 (L) 08/19/2019     (H) 08/19/2019    SANJAY 8.7 08/19/2019    MAG 2.2 05/01/2017    ALBUMIN 3.3 (L) 05/01/2017    PROTTOTAL 6.6 (L) 05/01/2017    ALT 19 05/01/2017    AST 11 05/01/2017    ALKPHOS 55 05/01/2017    BILITOTAL 0.5 05/01/2017    LIPASE 120 05/01/2017    AMYLASE 39 05/01/2017    TSH 1.02 05/01/2017       Preop Vitals  BP Readings from Last 3 Encounters:   08/19/19 162/83   02/26/18 151/71   05/01/17 136/69    Pulse Readings from Last 3 Encounters:   05/01/17 60      Resp Readings from Last 3 Encounters:   08/19/19 18   05/01/17 16    SpO2 Readings from Last 3 Encounters:   08/19/19 97%   02/26/18 97%   05/01/17 96%      Temp Readings from Last 1 Encounters:   08/19/19 96.8  F (36  C) (Tympanic)    Ht Readings from Last 1 Encounters:   02/26/18 1.854 m (6' 1\")      Wt Readings from Last 1 Encounters:   02/26/18 115.4 kg (254 lb 6.4 oz)    Estimated body mass index is 33.56 kg/m  as calculated from the following:    Height as of 2/26/18: 1.854 m (6' 1\").    Weight as of 2/26/18: 115.4 kg (254 lb 6.4 oz).       Anesthesia Plan      History & Physical Review  History and physical reviewed and following examination; no interval change.    ASA Status:  3 emergent.    NPO Status:  > 8 hours    Plan for General, ETT and RSI with Intravenous and Propofol induction. Maintenance will be Balanced.    PONV prophylaxis:  Ondansetron (or other 5HT-3) and Dexamethasone or Solumedrol  Additional equipment: Videolaryngoscope Pt is to remain intubated till tomorrow due to epiglottis swelling.      Postoperative Care  Postoperative pain management:  IV analgesics and Oral pain medications.      Consents  Anesthetic plan, risks, benefits and " alternatives discussed with:  Patient and Spouse..                 RAYMUNDO Alejo CRNA

## 2019-08-19 NOTE — ED TRIAGE NOTES
Patient presents with complaints of a sore throat x 2-3 weeks.  Voice is muffled.  Patient denies any knowledge of running fevers.

## 2019-08-19 NOTE — PROGRESS NOTES
Urban was kept intubated and sent up to the ICU and will await transfer tonight to [facility] where interventional radiology has agreed to evaluate Urban for planned aspiration tomorrow.  I spoke with Dr. Angel, IR physician.  I spoke with  Dr. Torres medical/critical care at [facility] who accepted the patient.   I spoke with Dr. Chapman, hospitalist, who will arrange the transfer.   I spoke with Urban's wife and daughter and they are aware of the reason for transfer, all questions were answered.

## 2019-08-19 NOTE — H&P
Admitted:     08/19/2019      CHIEF COMPLAINT:  Difficulty swallowing.      HISTORY OF PRESENT ILLNESS:  Urban is an 83-year-old gentleman who [facility]  He has had a couple weeks of sort of a cough and sore throat that seemingly just started to get worse over this last week or so, a great deal of difficulty swallowing this week.  A cough, a pretty sore throat also.  No barb aspiration at all.  He has had no fevers or shaking chills, no hemoptysis, no major shortness of breath at all.  It was just getting to the point where he could not eat any solid, he has just been taking sips of water.      No chest pains or palpitations or syncope.  No abdominal pain.  He has had some loose stools due to the fact he is only taking liquids in.  No blood in his stool at all.  No difficulty voiding.  No peripheral edema.  No skin rashes at all.  Weight has been stable as far as he knows, although has not weighed himself.  No peripheral edema.  No palpitations at all.  Blood sugars:  He has not been checking his blood sugars at all.  He does have diabetes and is on oral agents.      He was seen in the ER.  He was hemodynamically stable, a little bit hypertension, heart rate was okay.  He was afebrile.  His sats were 98% on room air.  Does have sort of a hot or muffled voice.  Sometimes it with a few coughing jags.  They did do a CT scan of his neck and it shows a roughly 2-2.5 cm abscess just above his vallecula and suprahyoid area.  He was seen in the ER by Dr. Lew of ENT, was given a dose of Zosyn along with Decadron and the tentative plan is for him to go to the OR later this afternoon for drainage of this abscess.      Normally, he is very active.  He is up a bit dizzy.  He was out fishing this last week or so.  Denies any shortness of breath or anything that really slows him down much at all.      PAST MEDICAL HISTORY:  Significant for:   1. Diabetes mellitus type 2.  He is on glipizide, metformin  and pioglitazone.  His last A1c was 6.9%.  This was back in 2017, the only one I have available.   2. History of some hypertension, although not on any new medications.   3. He has a history of a left subdural hematoma that was drained in  with antionette holes, was left with some minor speech impediment and a little bit of stuttering and word finding difficulty.   4. Appendectomy.   5. Umbilical hernia repair.   6. Rectal fissure repair.   7. Tonsillectomy.   8. History of gout.   9. Status post left total knee arthroplasty.      ALLERGIES:  HE HAS NO KNOWN DRUG ALLERGIES.      MEDICATIONS:  Glipizide 10 mg, he takes 2 tabs every day and 1 tablet every evening, metformin 1000 mg twice a day, pioglitazone 45 mg tab 1 tablet every day.  Takes some lipid, glucosamine, cholecalciferol, and ascorbic acid also.  Also takes allopurinol 100 mg, takes 2 tabs daily.      SOCIAL HISTORY:  He is retired.  He was in the [employer] from  to .  Lives on [address].  He has 2 children.  Nonsmoker, quit 40 years ago.  Rare beer drinker.      FAMILY HISTORY:  Father  in the , unclear reason.  Mother  young of cancer.  One sister  of breast cancer.  Another brother with diabetes.      REVIEW OF SYSTEMS:  Pertinent positives and negatives noted above in the HPI.      PHYSICAL EXAMINATION:   GENERAL:  Alert, pleasant gentleman with a very muffled kind of hot potato voice.   VITAL SIGNS:  Show blood pressure 164/84, pulse 70 and regular.  Sats are 98% on room air, temp is 96.8 degrees.   HEENT:  Normocephalic, atraumatic.  Pupils equal, round, reactive.  Sclerae are clear.   NECK:  Supple, no obvious lymphadenopathy or thyromegaly palpable.  His pharynx is difficult to assess, it may be mildly swollen at the base of the tongue area.   LUNGS:  Clear to auscultation.   CARDIAC:  Reveals a regular rate and rhythm.  Normal S1, S2.  No audible murmurs, rubs or gallops.  Neck veins are flat, no carotid bruits.  Pulses are  2+ and equal throughout.   ABDOMEN:  Soft, nontender, bowel sounds are positive.  No masses or organomegaly.   EXTREMITIES:  Without cyanosis, clubbing or edema.  He is status post left total knee arthroplasty.   NEUROLOGIC:  Nonfocal.      Chest x-ray shows a questionable left minimal infiltrate, though it is really not very visible.  CT scan does show the 2-2.5 mm mass midline suprahyoid just above the vallecula.      Sodium is 126, potassium 4.5, chloride 89, CO2 is 32, BUN is 9, creatinine 0.62.  Anion gap is 5.  CRP 24, calcium is 8.7, white count 12,400 with 88% neutrophils, 14.2 for hemoglobin and platelet count is 482,000.  Beta strep screen was negative.      ASSESSMENT:   1. Pharyngeal/neck abscess.  The patient presented with 2 weeks of increasing cough, sore throat, and now difficulty swallowing.  Does have a significant abscess noted.  He is protecting his airway at this point.  He has been seen by ENT.  He has been given some Decadron and IV Zosyn.  He will be taken to the operating room for drainage of this by Dr. Lew this afternoon.  We will continue with the IV Zosyn.  As I said, he is protecting his airway and maintaining adequate oxygenation.   2. Diabetes mellitus type 2, on oral agents.  We will cover with sliding scale coverage, as he is n.p.o. currently.   3. Hypertension, mildly so.  Not overly concerned.  He is not on any outpatient medications.  He is hemodynamically stable, no signs of sepsis at this point.  We will just monitor for now.   4. History of gout.  No acute flares.  He is on allopurinol.  We will hold this for now.   5. History of a subdural hematoma back in 2012, required antionette holes and drainage, left with some minimal speech impediment, but for the most part to recover quite nicely.   6. Hyponatremia.  The patient does have some mild hyponatremia, chronically has some mild hyponatremia also.  It is probably a little worsened just due to increased free water intake.   He has been drinking just much more water than anything else and little solutes.  Not at a dangerous level.  He has gotten some saline downstairs.  We will repeat his sodium later today.      Should tolerate the procedure well.  Certainly does have diabetes, some mild hypertension, is 83 years old, but has been very active without any recent episodes of chest pains, palpitations or other significant cardiac issues.  We will get an EKG prior to this procedure.  The patient admitted to the hospital postoperatively.  Whether he will be ICU or the floor is unclear; it will depend upon what Dr. Lew finds and how he is postoperatively.      He is a full code at this point.      Per anesthesia report, Dr. Lew plans on keeping the patient intubated postoperatively due to the laryngeal edema seen.  Therefore will be ICU overnight on the ventilator.  Tentative plan is for extubation tomorrow the presence of ENT anesthesia.  Addendum #[id number] added lg 19            GUS YIN MD             D: 2019   T: 2019   MT: GIL      Name:     DIOGENES SCHULZ   MRN:      [id number]        Account:      [id number]   :      1935        Admitted:     2019                   Document: [id number]       cc: [facility]

## 2019-08-19 NOTE — DISCHARGE SUMMARY
Admit Date:     08/19/2019   Discharge Date:           DISCHARGE DIAGNOSES:   1.  Abscess/cyst base of tongue.   2.  Epiglottic edema.   3.  Diabetes mellitus type 2.   4.  Hypertension.      PROCEDURES:  Micro direct laryngoscopy with biopsies, intubation.      HOSPITAL COURSE:  The patient is an 83-year-old gentleman who has significant history of diabetes mellitus type 2, on oral agents along with some mild hypertension, not treated.  Had a previous subdural hematoma back in 2012 with evacuation.  He presented to the ER with complaints of a couple weeks of increasing sore throat, cough, difficulty swallowing, which was getting progressively worse and only being able to take sips of water.  Finally came to the ER for evaluation.  He had significant very muffled voice.  He was afebrile and hemodynamically stable.  His lab work basically was unremarkable.  He had some mild hyponatremia, rated around 126.  Otherwise, BUN and creatinine were fine at 09 and 0.62.  White count was 12,000, hemoglobin was 14.2.  He subsequently underwent a CT scan of his neck which did show a roughly 2-2.5 cm fluid collection at the base of the tongue just anterior to the epiglottis.  There was a fair amount of edema noted.  He was seen by ENT, was given a dose of Zosyn 4.5 grams along with 12 mg of Decadron and the tentative plan was to take him to the OR.  He was brought to the OR per anesthesia.  There was a significant amount of edema noted of his epiglottis.  However, the location of the fluid collection was very difficult to gain access to and Dr. Lew was unable to drain this area.  Subsequently, transported back up here to our ICU intubated, sedated on propofol.  This apparently will need to be approached via Interventional Radiology.  She has contacted the critical care physicians at , Dr. Romo and he has accepted the patient in transfer.  As I said, currently the patient is sedated with propofol,  hemodynamically stable, oxygenating well with an oral ET tube in place.  He will need to be transferred via ground ambulance on the ventilator to [facility].      CURRENT VITAL SIGNS:  Blood pressure between 100-130 systolic.  Heart rate has been in the 80s.  He is on 45% FiO2 with O2 sats 98%.  He is on assist control rate of 12, tidal volume 750 and 5 of PEEP at this point because pressures were actually 30 at this point.      The patient will continue at this point on Zosyn 3.375 mg IV every 6 hours.  Further Decadron per ENT recommendations.  He is on a propofol drip for sedation.  He is a full code.  Sliding scale coverage with Humalog.  His admission labs were basically within normal range.  His white count is 12,400, hemoglobin is 14.2, platelet count is 482,000.  CRP was 24.3.  Sodium 126, potassium 4.5, chloride 89, CO2 is 32, BUN is 9, creatinine is 0.62.  Glucose is 192.  He has a Antonio catheter also in place.  His beta strep screen was negative.  A chest x-ray is pending post his intubation.         GUS YIN MD             D: 2019   T: 2019   MT: NICOLE      Name:     DIOGENES SCHULZ   MRN:      [id number]        Account:        [id number]   :      1935           Admit Date:     2019                                  Discharge Date:       Document: [id number]

## 2019-08-19 NOTE — ED PROVIDER NOTES
History     Chief Complaint   Patient presents with     Pharyngitis     HPI  Urban Huizar is a 83 year old male with PMH type 2 DM who presents to  for evaluation of sore throat and cough. He reports he developed symptoms about 2 weeks ago. His coughing has decreased however he still feels very full in his throat with some radiation into b/l ears. Has had some difficulty swallowing saliva and is spitting it out at times. He has not been able to eat much of solid foods due to the throat discomfort and feeling like the swelling is blocking it. He tried to eat a roast the other day but had to give it to the dog because he couldn't swallow it. Has been trying lozenges, sleeping, Mucinex, and danial seltzer with little relief. Denies known fevers and has not been feeling chilled. He states about a year ago he had a similar event but it went away on its own. Denies known sick contacts or recent travel. Has not checked blood sugars in about a year. He has been fatigued with this illness. Cough has improved and no hemoptysis.     Allergies:  No Known Allergies    Problem List:    There are no active problems to display for this patient.       Past Medical History:    No past medical history on file.    Past Surgical History:    Past Surgical History:   Procedure Laterality Date     PHACOEMULSIFICATION WITH STANDARD INTRAOCULAR LENS IMPLANT Left 2018    Procedure: PHACOEMULSIFICATION WITH STANDARD INTRAOCULAR LENS IMPLANT;  PHACOEMULSIFICATION CATARACT EXTRACTION POSTERIOR CHAMBER LENS LEFT;  Surgeon: Baljinder Shannon MD;  Location: [facility] OR       Family History:    No family history on file.    Social History:  Marital Status:   [2]  Social History     Tobacco Use     Smoking status: Former Smoker     Packs/day: 4.00     Years: 30.00     Pack years: 120.00     Types: Cigarettes     Start date: 1946     Last attempt to quit: 1977     Years since quittin.5     Smokeless tobacco: Never Used    Substance Use Topics     Alcohol use: Yes     Comment: rarely     Drug use: No        Medications:      ALLOPURINOL PO   Ascorbic Acid (VITAMIN C PO)   GLIPIZIDE PO   GLUCOSAMINE SULFATE PO   METFORMIN HCL PO   omega-3 acid ethyl esters (LOVAZA) 1 G capsule   PIOGLITAZONE HCL PO   UNABLE TO FIND   UNABLE TO FIND   VITAMIN D, CHOLECALCIFEROL, PO   VITAMIN E NATURAL PO         Review of Systems   Constitutional: Positive for appetite change and fatigue. Negative for chills and fever.   HENT: Positive for ear pain, sore throat, trouble swallowing and voice change. Negative for congestion.    Respiratory: Positive for cough.    Gastrointestinal: Negative for abdominal pain, nausea and vomiting.   Skin: Negative for rash and wound.   Allergic/Immunologic: Positive for immunocompromised state (type 2 DM).   Neurological: Negative for syncope.   Psychiatric/Behavioral: Negative for confusion.       Physical Exam   BP: 162/83  Heart Rate: 62  Temp: 96.8  F (36  C)  Resp: 18  SpO2: 97 %      Physical Exam   Constitutional: He is oriented to person, place, and time. He appears well-developed and well-nourished.  Non-toxic appearance. He does not appear ill. No distress.   Ambulates independently, no trismus, tolerating oral secretions and drinking coffee   HENT:   Head: Normocephalic and atraumatic.   Right Ear: Tympanic membrane and ear canal normal.   Left Ear: Tympanic membrane and ear canal normal.   Mouth/Throat: Mucous membranes are normal. No oropharyngeal exudate, posterior oropharyngeal edema or posterior oropharyngeal erythema. Tonsils are 0 on the right. Tonsils are 0 on the left. No tonsillar exudate.   Significant fullness in submandibular space through upper neck b/l. He is s/p tonsillectomy. Muffled voice   Eyes: Pupils are equal, round, and reactive to light. EOM are normal.   Cardiovascular: Normal rate, regular rhythm, normal heart sounds and intact distal pulses.   Pulmonary/Chest: Effort normal and  breath sounds normal.   Coarse breath sounds at bases   Neurological: He is alert and oriented to person, place, and time.   Skin: Skin is warm. No rash noted.   Psychiatric: He has a normal mood and affect. His behavior is normal.   Nursing note and vitals reviewed.      ED Course     ED Course as of Aug 19 1236   Mon Aug 19, 2019   1050 128 corrected sodium for hyperglycemia      1113 Abscess right under tongue above hyoid; may be infected thyroglossal duct cyst; 2.7 cm      1131 Has not smoked in 40 years      1132 Spray with afrin nostril 2 sprays every 5 minutes x 2         Procedures               Critical Care time:  none               Results for orders placed or performed during the hospital encounter of 08/19/19 (from the past 24 hour(s))   Rapid strep screen   Result Value Ref Range    Specimen Description Throat     Rapid Strep A Screen       NEGATIVE: No Group A streptococcal antigen detected by immunoassay, await culture report.   CBC with platelets differential   Result Value Ref Range    WBC 12.4 (H) 4.0 - 11.0 10e9/L    RBC Count 5.07 4.4 - 5.9 10e12/L    Hemoglobin 14.2 13.3 - 17.7 g/dL    Hematocrit 40.2 40.0 - 53.0 %    MCV 79 78 - 100 fl    MCH 28.0 26.5 - 33.0 pg    MCHC 35.3 31.5 - 36.5 g/dL    RDW 12.6 10.0 - 15.0 %    Platelet Count 482 (H) 150 - 450 10e9/L    Diff Method Automated Method     % Neutrophils 79.9 %    % Lymphocytes 10.6 %    % Monocytes 7.3 %    % Eosinophils 1.4 %    % Basophils 0.4 %    % Immature Granulocytes 0.4 %    Nucleated RBCs 0 0 /100    Absolute Neutrophil 9.9 (H) 1.6 - 8.3 10e9/L    Absolute Lymphocytes 1.3 0.8 - 5.3 10e9/L    Absolute Monocytes 0.9 0.0 - 1.3 10e9/L    Absolute Eosinophils 0.2 0.0 - 0.7 10e9/L    Absolute Basophils 0.1 0.0 - 0.2 10e9/L    Abs Immature Granulocytes 0.1 0 - 0.4 10e9/L    Absolute Nucleated RBC 0.0    Basic metabolic panel   Result Value Ref Range    Sodium 126 (L) 133 - 144 mmol/L    Potassium 4.5 3.4 - 5.3 mmol/L    Chloride 89 (L)  94 - 109 mmol/L    Carbon Dioxide 32 20 - 32 mmol/L    Anion Gap 5 3 - 14 mmol/L    Glucose 192 (H) 70 - 99 mg/dL    Urea Nitrogen 9 7 - 30 mg/dL    Creatinine 0.62 (L) 0.66 - 1.25 mg/dL    GFR Estimate >90 >60 mL/min/[1.73_m2]    GFR Estimate If Black >90 >60 mL/min/[1.73_m2]    Calcium 8.7 8.5 - 10.1 mg/dL   CRP inflammation   Result Value Ref Range    CRP Inflammation 24.3 (H) 0.0 - 8.0 mg/L   Soft tissue neck CT w contrast    Narrative    CT SOFT TISSUE NECK W CONTRAST    HISTORY: 83 yearsMale Sore throat/stridor, epiglottis or tonsillitis  suspected    TECHNIQUE: Axial CT imaging of the neck was performed with intervenous  contrast. Coronal and sagittal reconstructions were obtained.    COMPARISON: None    FINDINGS: There is a fluid collection with irregular enhancing walls  at the midline of the neck, at the tongue base and anterior to the  vallecula. This fluid collection measures 2.7 x 2.7 x 2.3 cm in  dimension. It is located just above the hyoid.    The parotid glands submandibular glands and thyroid are unremarkable.    There is thickening of the epiglottis. There is no evidence of  lymphadenopathy or mass otherwise within the neck.      Impression    IMPRESSION: There is a midline abscess within the suprahyoid neck,  anterior to the vallecula at the tongue base. The abscess measures 2.7  x 2.7 x 2.3 cm in dimension. Location could be compatible with an  infected thyroglossal duct cyst.    There is thickening of the epiglottis, possibly reactive in nature.  Epiglottitis or cannot be excluded.    BRIAN CHADWICK MD   Chest XR,  PA & LAT    Narrative    XR CHEST 2 VW    HISTORY: 83 years Male cough x 2 weeks    COMPARISON: None    TECHNIQUE: 2 views of the chest were obtained.    FINDINGS: Heart size and pulmonary vascularity are within normal  limits. There is subtle airspace opacity at the left lung base. The  lungs are otherwise clear.    There is mild interstitial prominence.      Impression     IMPRESSION: Subtle left basilar airspace opacity. Question early or  mild pneumonia versus atelectasis.    BRIAN CHADWICK MD       Medications   sodium chloride (PF) 0.9% PF flush 60 mL (60 mLs Intravenous Given 8/19/19 1053)   iohexol (OMNIPAQUE) 300 mg/mL injection 100 mL (100 mLs Intravenous Given 8/19/19 1052)   0.9% sodium chloride BOLUS (0 mLs Intravenous Stopped 8/19/19 1224)   piperacillin-tazobactam (ZOSYN) intermittent infusion 4.5 g (0 g Intravenous Stopped 8/19/19 1224)   oxymetazoline (AFRIN) 0.05 % spray 2 spray (2 sprays Both Nostrils Given 8/19/19 1148)   dexamethasone (DECADRON) injection 12 mg (12 mg Intravenous Given 8/19/19 1223)       Assessments & Plan (with Medical Decision Making)     I have reviewed the nursing notes.    I have reviewed the findings, diagnosis, plan and need for follow up with the patient.   *Pt presented to  for evaluation of 2 weeks of sore throat with cough. Initial rapid strep negative. Most concerning about his presentation was his muffled voice in absence of any tonsillar hypertrophy (s/p tonsillectomy). Given the fullness on exam and difficulties swallowing solid food and saliva, obtained CT with contrast. He was stable, tolerating secretions on exam, and in no respiratory distress. CT returned showing an abscess just beneath base of tongue measuring up to 2.7 cm. Dr. Santana of ENT was consulted and asked for surgical treatment; she agreed to meet with patient and requested Afrin intranasally. Patient was moved to an ED bed for frequent monitoring and started on Zosyn-- no known allergies. 500 ml NS bolus was also given as he was hyponatremic (128 corrected for hyperglycemia). Care was signed over to Dr. Temple; please refer to her note for further details.     New Prescriptions    No medications on file       Final diagnoses:   Abscess of neck       8/19/2019   [facility] EM[facility]     Anca Camacho PA-C  08/19/19 [id number]

## 2019-08-19 NOTE — PROGRESS NOTES
Patient arrived to the unit intubated with a 7.0 ETT secured at 23cm at the lip.  Placement confirmed with chest xray.  Breath sounds clear and equal bilat.  Set up on ventilator with settings per MD of AC 12 750 5 peep and fio2 to keep sats >92%.  Adjusted Vt to keep EtCO2 35-45.  Now with a volume set at 600.

## 2019-08-19 NOTE — ED NOTES
Dr. Lew was in to see patient. Scoped patient airway. Patient tolerated fairly well. Wife in room with patient.

## 2019-08-20 NOTE — ANESTHESIA POSTPROCEDURE EVALUATION
Patient: Urban Huizar    Procedure(s):  INCISION AND DRAINAGE CENTRAL TONGUE ABSCESS Direct Laryngoscopy    Diagnosis:TONGUE ABSCESS  Diagnosis Additional Information: No value filed.    Anesthesia Type:  General, ETT, RSI    Note:  Anesthesia Post Evaluation    Patient location during evaluation: ICU and Bedside  Patient participation: Unable to participate in evaluation secondary to underlying medical condition  Post-procedure mental status: sedated.  Pain management: adequate  Airway patency: patent  Cardiovascular status: acceptable  Respiratory status: intubated and acceptable  Hydration status: acceptable  PONV: none     Anesthetic complications: None          Last vitals:  Vitals:    08/19/19 1830 08/19/19 1845 08/19/19 1900   BP: 152/77 139/70 138/77   Resp: 14 13 12   Temp:      SpO2: 100% 100% 99%         Electronically Signed By: Willam Martins MD  August 20, 2019  7:35 AM

## 2019-08-21 LAB
BACTERIA SPEC CULT: NORMAL
COPATH REPORT: NORMAL
SPECIMEN SOURCE: NORMAL

## 2021-02-09 ENCOUNTER — IMMUNIZATION (OUTPATIENT)
Dept: FAMILY MEDICINE | Facility: OTHER | Age: 86
End: 2021-02-09
Attending: FAMILY MEDICINE
Payer: MEDICARE

## 2021-02-09 PROCEDURE — 91300 PR COVID VAC PFIZER DIL RECON 30 MCG/0.3 ML IM: CPT

## 2021-03-02 ENCOUNTER — IMMUNIZATION (OUTPATIENT)
Dept: FAMILY MEDICINE | Facility: OTHER | Age: 86
End: 2021-03-02
Attending: FAMILY MEDICINE
Payer: MEDICARE

## 2021-03-02 PROCEDURE — 91300 PR COVID VAC PFIZER DIL RECON 30 MCG/0.3 ML IM: CPT

## 2022-02-01 ENCOUNTER — APPOINTMENT (OUTPATIENT)
Dept: GENERAL RADIOLOGY | Facility: HOSPITAL | Age: 87
DRG: 193 | End: 2022-02-01
Attending: PHYSICIAN ASSISTANT
Payer: COMMERCIAL

## 2022-02-01 ENCOUNTER — HOSPITAL ENCOUNTER (INPATIENT)
Facility: HOSPITAL | Age: 87
LOS: 3 days | Discharge: HOME | DRG: 193 | End: 2022-02-04
Attending: PHYSICIAN ASSISTANT | Admitting: INTERNAL MEDICINE
Payer: COMMERCIAL

## 2022-02-01 DIAGNOSIS — J18.9 MULTIFOCAL PNEUMONIA: ICD-10-CM

## 2022-02-01 DIAGNOSIS — J96.01 ACUTE RESPIRATORY FAILURE WITH HYPOXIA (H): ICD-10-CM

## 2022-02-01 PROBLEM — E11.9 TYPE 2 DIABETES MELLITUS WITHOUT COMPLICATION, WITHOUT LONG-TERM CURRENT USE OF INSULIN (H): Status: ACTIVE | Noted: 2019-08-19

## 2022-02-01 PROBLEM — E87.1 HYPONATREMIA: Status: ACTIVE | Noted: 2019-08-19

## 2022-02-01 PROBLEM — M1A.9XX0 CHRONIC GOUT WITHOUT TOPHUS: Status: ACTIVE | Noted: 2022-02-01

## 2022-02-01 LAB
ALBUMIN SERPL-MCNC: 2.3 G/DL (ref 3.4–5)
ALBUMIN UR-MCNC: 10 MG/DL
ALP SERPL-CCNC: 77 U/L (ref 40–150)
ALT SERPL W P-5'-P-CCNC: 41 U/L (ref 0–70)
ANION GAP SERPL CALCULATED.3IONS-SCNC: 9 MMOL/L (ref 3–14)
APPEARANCE UR: CLEAR
AST SERPL W P-5'-P-CCNC: 25 U/L (ref 0–45)
BASOPHILS # BLD AUTO: 0 10E3/UL (ref 0–0.2)
BASOPHILS NFR BLD AUTO: 0 %
BILIRUB SERPL-MCNC: 0.7 MG/DL (ref 0.2–1.3)
BILIRUB UR QL STRIP: NEGATIVE
BUN SERPL-MCNC: 22 MG/DL (ref 7–30)
CALCIUM SERPL-MCNC: 8.7 MG/DL (ref 8.5–10.1)
CHLORIDE BLD-SCNC: 92 MMOL/L (ref 94–109)
CO2 SERPL-SCNC: 26 MMOL/L (ref 20–32)
COLOR UR AUTO: ABNORMAL
CREAT SERPL-MCNC: 0.79 MG/DL (ref 0.66–1.25)
CRP SERPL-MCNC: 310 MG/L (ref 0–8)
D DIMER PPP FEU-MCNC: 1.96 UG/ML FEU (ref 0–0.5)
EOSINOPHIL # BLD AUTO: 0 10E3/UL (ref 0–0.7)
EOSINOPHIL NFR BLD AUTO: 0 %
ERYTHROCYTE [DISTWIDTH] IN BLOOD BY AUTOMATED COUNT: 12.8 % (ref 10–15)
FERRITIN SERPL-MCNC: 560 NG/ML (ref 26–388)
FLUAV RNA SPEC QL NAA+PROBE: NEGATIVE
FLUBV RNA RESP QL NAA+PROBE: NEGATIVE
GFR SERPL CREATININE-BSD FRML MDRD: 88 ML/MIN/1.73M2
GLUCOSE BLD-MCNC: 243 MG/DL (ref 70–99)
GLUCOSE UR STRIP-MCNC: >1000 MG/DL
HCT VFR BLD AUTO: 40.9 % (ref 40–53)
HGB BLD-MCNC: 13.9 G/DL (ref 13.3–17.7)
HGB UR QL STRIP: ABNORMAL
HOLD SPECIMEN: NORMAL
HOLD SPECIMEN: NORMAL
IMM GRANULOCYTES # BLD: 0.2 10E3/UL
IMM GRANULOCYTES NFR BLD: 1 %
KETONES UR STRIP-MCNC: 60 MG/DL
LACTATE SERPL-SCNC: 1.8 MMOL/L (ref 0.7–2)
LDH SERPL L TO P-CCNC: 218 U/L (ref 85–227)
LEUKOCYTE ESTERASE UR QL STRIP: NEGATIVE
LYMPHOCYTES # BLD AUTO: 0.5 10E3/UL (ref 0.8–5.3)
LYMPHOCYTES NFR BLD AUTO: 2 %
MCH RBC QN AUTO: 28 PG (ref 26.5–33)
MCHC RBC AUTO-ENTMCNC: 34 G/DL (ref 31.5–36.5)
MCV RBC AUTO: 82 FL (ref 78–100)
MONOCYTES # BLD AUTO: 1.2 10E3/UL (ref 0–1.3)
MONOCYTES NFR BLD AUTO: 5 %
MUCOUS THREADS #/AREA URNS LPF: PRESENT /LPF
NEUTROPHILS # BLD AUTO: 20.3 10E3/UL (ref 1.6–8.3)
NEUTROPHILS NFR BLD AUTO: 92 %
NITRATE UR QL: NEGATIVE
NRBC # BLD AUTO: 0 10E3/UL
NRBC BLD AUTO-RTO: 0 /100
NT-PROBNP SERPL-MCNC: 900 PG/ML (ref 0–1800)
PH UR STRIP: 5.5 [PH] (ref 4.7–8)
PLATELET # BLD AUTO: 361 10E3/UL (ref 150–450)
POTASSIUM BLD-SCNC: 4.2 MMOL/L (ref 3.4–5.3)
PROCALCITONIN SERPL-MCNC: 1.64 NG/ML
PROT SERPL-MCNC: 6.7 G/DL (ref 6.8–8.8)
RBC # BLD AUTO: 4.97 10E6/UL (ref 4.4–5.9)
RBC URINE: 2 /HPF
RSV RNA SPEC NAA+PROBE: NEGATIVE
SARS-COV-2 RNA RESP QL NAA+PROBE: NEGATIVE
SODIUM SERPL-SCNC: 127 MMOL/L (ref 133–144)
SP GR UR STRIP: 1.04 (ref 1–1.03)
SQUAMOUS EPITHELIAL: <1 /HPF
UROBILINOGEN UR STRIP-MCNC: NORMAL MG/DL
WBC # BLD AUTO: 22.2 10E3/UL (ref 4–11)
WBC URINE: 1 /HPF

## 2022-02-01 PROCEDURE — 83880 ASSAY OF NATRIURETIC PEPTIDE: CPT | Performed by: PHYSICIAN ASSISTANT

## 2022-02-01 PROCEDURE — 80053 COMPREHEN METABOLIC PANEL: CPT | Performed by: PHYSICIAN ASSISTANT

## 2022-02-01 PROCEDURE — 36415 COLL VENOUS BLD VENIPUNCTURE: CPT | Performed by: PHYSICIAN ASSISTANT

## 2022-02-01 PROCEDURE — 120N000001 HC R&B MED SURG/OB

## 2022-02-01 PROCEDURE — 250N000011 HC RX IP 250 OP 636: Performed by: INTERNAL MEDICINE

## 2022-02-01 PROCEDURE — 84145 PROCALCITONIN (PCT): CPT | Performed by: PHYSICIAN ASSISTANT

## 2022-02-01 PROCEDURE — 83615 LACTATE (LD) (LDH) ENZYME: CPT | Performed by: PHYSICIAN ASSISTANT

## 2022-02-01 PROCEDURE — 96367 TX/PROPH/DG ADDL SEQ IV INF: CPT

## 2022-02-01 PROCEDURE — 87205 SMEAR GRAM STAIN: CPT | Performed by: INTERNAL MEDICINE

## 2022-02-01 PROCEDURE — 99223 1ST HOSP IP/OBS HIGH 75: CPT | Performed by: INTERNAL MEDICINE

## 2022-02-01 PROCEDURE — 250N000013 HC RX MED GY IP 250 OP 250 PS 637: Performed by: INTERNAL MEDICINE

## 2022-02-01 PROCEDURE — 258N000003 HC RX IP 258 OP 636: Performed by: INTERNAL MEDICINE

## 2022-02-01 PROCEDURE — 87040 BLOOD CULTURE FOR BACTERIA: CPT | Performed by: PHYSICIAN ASSISTANT

## 2022-02-01 PROCEDURE — 99285 EMERGENCY DEPT VISIT HI MDM: CPT | Mod: 25

## 2022-02-01 PROCEDURE — C9803 HOPD COVID-19 SPEC COLLECT: HCPCS

## 2022-02-01 PROCEDURE — 82728 ASSAY OF FERRITIN: CPT | Performed by: PHYSICIAN ASSISTANT

## 2022-02-01 PROCEDURE — 99285 EMERGENCY DEPT VISIT HI MDM: CPT | Performed by: PHYSICIAN ASSISTANT

## 2022-02-01 PROCEDURE — 87637 SARSCOV2&INF A&B&RSV AMP PRB: CPT | Performed by: PHYSICIAN ASSISTANT

## 2022-02-01 PROCEDURE — 86738 MYCOPLASMA ANTIBODY: CPT | Performed by: INTERNAL MEDICINE

## 2022-02-01 PROCEDURE — 250N000011 HC RX IP 250 OP 636: Performed by: PHYSICIAN ASSISTANT

## 2022-02-01 PROCEDURE — 96365 THER/PROPH/DIAG IV INF INIT: CPT

## 2022-02-01 PROCEDURE — 85025 COMPLETE CBC W/AUTO DIFF WBC: CPT | Performed by: PHYSICIAN ASSISTANT

## 2022-02-01 PROCEDURE — 86140 C-REACTIVE PROTEIN: CPT | Performed by: PHYSICIAN ASSISTANT

## 2022-02-01 PROCEDURE — 81001 URINALYSIS AUTO W/SCOPE: CPT | Performed by: PHYSICIAN ASSISTANT

## 2022-02-01 PROCEDURE — 85379 FIBRIN DEGRADATION QUANT: CPT | Performed by: PHYSICIAN ASSISTANT

## 2022-02-01 PROCEDURE — 83605 ASSAY OF LACTIC ACID: CPT | Performed by: PHYSICIAN ASSISTANT

## 2022-02-01 PROCEDURE — 258N000003 HC RX IP 258 OP 636: Performed by: PHYSICIAN ASSISTANT

## 2022-02-01 PROCEDURE — 71045 X-RAY EXAM CHEST 1 VIEW: CPT

## 2022-02-01 RX ORDER — ONDANSETRON 4 MG/1
4 TABLET, ORALLY DISINTEGRATING ORAL EVERY 6 HOURS PRN
Status: DISCONTINUED | OUTPATIENT
Start: 2022-02-01 | End: 2022-02-04 | Stop reason: HOSPADM

## 2022-02-01 RX ORDER — ALLOPURINOL 100 MG/1
200 TABLET ORAL DAILY
Status: DISCONTINUED | OUTPATIENT
Start: 2022-02-02 | End: 2022-02-04 | Stop reason: HOSPADM

## 2022-02-01 RX ORDER — DEXTROSE MONOHYDRATE 25 G/50ML
25-50 INJECTION, SOLUTION INTRAVENOUS
Status: DISCONTINUED | OUTPATIENT
Start: 2022-02-01 | End: 2022-02-04 | Stop reason: HOSPADM

## 2022-02-01 RX ORDER — CEFTRIAXONE SODIUM 2 G/50ML
2 INJECTION, SOLUTION INTRAVENOUS ONCE
Status: COMPLETED | OUTPATIENT
Start: 2022-02-01 | End: 2022-02-01

## 2022-02-01 RX ORDER — ACETAMINOPHEN 325 MG/1
975 TABLET ORAL EVERY 8 HOURS PRN
Status: DISCONTINUED | OUTPATIENT
Start: 2022-02-01 | End: 2022-02-04 | Stop reason: HOSPADM

## 2022-02-01 RX ORDER — GLIPIZIDE 5 MG/1
10 TABLET ORAL
Status: DISCONTINUED | OUTPATIENT
Start: 2022-02-02 | End: 2022-02-04 | Stop reason: HOSPADM

## 2022-02-01 RX ORDER — NICOTINE POLACRILEX 4 MG
15-30 LOZENGE BUCCAL
Status: DISCONTINUED | OUTPATIENT
Start: 2022-02-01 | End: 2022-02-04 | Stop reason: HOSPADM

## 2022-02-01 RX ORDER — ONDANSETRON 2 MG/ML
4 INJECTION INTRAMUSCULAR; INTRAVENOUS EVERY 6 HOURS PRN
Status: DISCONTINUED | OUTPATIENT
Start: 2022-02-01 | End: 2022-02-04 | Stop reason: HOSPADM

## 2022-02-01 RX ORDER — CODEINE PHOSPHATE AND GUAIFENESIN 10; 100 MG/5ML; MG/5ML
10 SOLUTION ORAL AT BEDTIME
Status: DISCONTINUED | OUTPATIENT
Start: 2022-02-01 | End: 2022-02-04 | Stop reason: HOSPADM

## 2022-02-01 RX ORDER — SODIUM CHLORIDE 9 MG/ML
INJECTION, SOLUTION INTRAVENOUS CONTINUOUS
Status: DISCONTINUED | OUTPATIENT
Start: 2022-02-01 | End: 2022-02-04 | Stop reason: HOSPADM

## 2022-02-01 RX ORDER — LIDOCAINE 40 MG/G
CREAM TOPICAL
Status: DISCONTINUED | OUTPATIENT
Start: 2022-02-01 | End: 2022-02-04 | Stop reason: HOSPADM

## 2022-02-01 RX ORDER — CEFTRIAXONE SODIUM 2 G/50ML
2 INJECTION, SOLUTION INTRAVENOUS EVERY 24 HOURS
Status: DISCONTINUED | OUTPATIENT
Start: 2022-02-02 | End: 2022-02-04 | Stop reason: HOSPADM

## 2022-02-01 RX ORDER — IPRATROPIUM BROMIDE AND ALBUTEROL SULFATE 2.5; .5 MG/3ML; MG/3ML
3 SOLUTION RESPIRATORY (INHALATION) EVERY 4 HOURS PRN
Status: DISCONTINUED | OUTPATIENT
Start: 2022-02-01 | End: 2022-02-04 | Stop reason: HOSPADM

## 2022-02-01 RX ADMIN — CEFTRIAXONE SODIUM 2 G: 2 INJECTION, SOLUTION INTRAVENOUS at 18:46

## 2022-02-01 RX ADMIN — ENOXAPARIN SODIUM 40 MG: 40 INJECTION SUBCUTANEOUS at 22:16

## 2022-02-01 RX ADMIN — AZITHROMYCIN MONOHYDRATE 500 MG: 500 INJECTION, POWDER, LYOPHILIZED, FOR SOLUTION INTRAVENOUS at 19:14

## 2022-02-01 RX ADMIN — GUAIFENESIN AND CODEINE PHOSPHATE 10 ML: 10; 100 LIQUID ORAL at 22:16

## 2022-02-01 RX ADMIN — SODIUM CHLORIDE: 9 INJECTION, SOLUTION INTRAVENOUS at 22:07

## 2022-02-01 ASSESSMENT — ENCOUNTER SYMPTOMS
APPETITE CHANGE: 1
RHINORRHEA: 0
VOMITING: 0
CHEST TIGHTNESS: 0
LIGHT-HEADEDNESS: 1
ACTIVITY CHANGE: 1
HEADACHES: 0
WEAKNESS: 1
ABDOMINAL PAIN: 0
NECK STIFFNESS: 0
COUGH: 1
CHILLS: 1
SINUS PAIN: 0
SHORTNESS OF BREATH: 1
FEVER: 1
SINUS PRESSURE: 0
NAUSEA: 0
FATIGUE: 1
DIZZINESS: 1
BACK PAIN: 0
PHOTOPHOBIA: 0
NECK PAIN: 0

## 2022-02-01 ASSESSMENT — MIFFLIN-ST. JEOR: SCORE: 1726.88

## 2022-02-01 ASSESSMENT — ACTIVITIES OF DAILY LIVING (ADL)
ADLS_ACUITY_SCORE: 7
ADLS_ACUITY_SCORE: 8
ADLS_ACUITY_SCORE: 7

## 2022-02-02 ENCOUNTER — APPOINTMENT (OUTPATIENT)
Dept: GENERAL RADIOLOGY | Facility: HOSPITAL | Age: 87
DRG: 193 | End: 2022-02-02
Attending: INTERNAL MEDICINE
Payer: COMMERCIAL

## 2022-02-02 ENCOUNTER — APPOINTMENT (OUTPATIENT)
Dept: SPEECH THERAPY | Facility: HOSPITAL | Age: 87
DRG: 193 | End: 2022-02-02
Attending: INTERNAL MEDICINE
Payer: COMMERCIAL

## 2022-02-02 LAB
ALBUMIN SERPL-MCNC: 1.9 G/DL (ref 3.4–5)
ALP SERPL-CCNC: 64 U/L (ref 40–150)
ALT SERPL W P-5'-P-CCNC: 32 U/L (ref 0–70)
ANION GAP SERPL CALCULATED.3IONS-SCNC: 5 MMOL/L (ref 3–14)
AST SERPL W P-5'-P-CCNC: 19 U/L (ref 0–45)
BACTERIA SPT CULT: NORMAL
BASOPHILS # BLD AUTO: 0 10E3/UL (ref 0–0.2)
BASOPHILS NFR BLD AUTO: 0 %
BILIRUB SERPL-MCNC: 0.6 MG/DL (ref 0.2–1.3)
BUN SERPL-MCNC: 20 MG/DL (ref 7–30)
CALCIUM SERPL-MCNC: 8.3 MG/DL (ref 8.5–10.1)
CHLORIDE BLD-SCNC: 96 MMOL/L (ref 94–109)
CO2 SERPL-SCNC: 29 MMOL/L (ref 20–32)
CREAT SERPL-MCNC: 0.74 MG/DL (ref 0.66–1.25)
EOSINOPHIL # BLD AUTO: 0 10E3/UL (ref 0–0.7)
EOSINOPHIL NFR BLD AUTO: 0 %
ERYTHROCYTE [DISTWIDTH] IN BLOOD BY AUTOMATED COUNT: 12.8 % (ref 10–15)
GFR SERPL CREATININE-BSD FRML MDRD: 89 ML/MIN/1.73M2
GLUCOSE BLD-MCNC: 154 MG/DL (ref 70–99)
GLUCOSE BLDC GLUCOMTR-MCNC: 153 MG/DL (ref 70–99)
GLUCOSE BLDC GLUCOMTR-MCNC: 189 MG/DL (ref 70–99)
GLUCOSE BLDC GLUCOMTR-MCNC: 222 MG/DL (ref 70–99)
GLUCOSE BLDC GLUCOMTR-MCNC: 256 MG/DL (ref 70–99)
GRAM STAIN RESULT: NORMAL
HCT VFR BLD AUTO: 36.9 % (ref 40–53)
HGB BLD-MCNC: 12.4 G/DL (ref 13.3–17.7)
IMM GRANULOCYTES # BLD: 0.1 10E3/UL
IMM GRANULOCYTES NFR BLD: 1 %
L PNEUMO1 AG UR QL IA: NEGATIVE
LACTATE SERPL-SCNC: 1.1 MMOL/L (ref 0.7–2)
LYMPHOCYTES # BLD AUTO: 1.3 10E3/UL (ref 0.8–5.3)
LYMPHOCYTES NFR BLD AUTO: 7 %
MCH RBC QN AUTO: 27.8 PG (ref 26.5–33)
MCHC RBC AUTO-ENTMCNC: 33.6 G/DL (ref 31.5–36.5)
MCV RBC AUTO: 83 FL (ref 78–100)
MONOCYTES # BLD AUTO: 1.2 10E3/UL (ref 0–1.3)
MONOCYTES NFR BLD AUTO: 7 %
NEUTROPHILS # BLD AUTO: 15.2 10E3/UL (ref 1.6–8.3)
NEUTROPHILS NFR BLD AUTO: 85 %
NRBC # BLD AUTO: 0 10E3/UL
NRBC BLD AUTO-RTO: 0 /100
PLATELET # BLD AUTO: 325 10E3/UL (ref 150–450)
POTASSIUM BLD-SCNC: 4 MMOL/L (ref 3.4–5.3)
PROCALCITONIN SERPL-MCNC: 3.48 NG/ML
PROT SERPL-MCNC: 5.8 G/DL (ref 6.8–8.8)
RBC # BLD AUTO: 4.46 10E6/UL (ref 4.4–5.9)
SODIUM SERPL-SCNC: 130 MMOL/L (ref 133–144)
WBC # BLD AUTO: 17.9 10E3/UL (ref 4–11)

## 2022-02-02 PROCEDURE — 250N000013 HC RX MED GY IP 250 OP 250 PS 637: Performed by: INTERNAL MEDICINE

## 2022-02-02 PROCEDURE — 71046 X-RAY EXAM CHEST 2 VIEWS: CPT

## 2022-02-02 PROCEDURE — 250N000009 HC RX 250: Performed by: INTERNAL MEDICINE

## 2022-02-02 PROCEDURE — 85025 COMPLETE CBC W/AUTO DIFF WBC: CPT | Performed by: INTERNAL MEDICINE

## 2022-02-02 PROCEDURE — 87899 AGENT NOS ASSAY W/OPTIC: CPT | Performed by: INTERNAL MEDICINE

## 2022-02-02 PROCEDURE — 120N000001 HC R&B MED SURG/OB

## 2022-02-02 PROCEDURE — 87205 SMEAR GRAM STAIN: CPT | Performed by: INTERNAL MEDICINE

## 2022-02-02 PROCEDURE — 258N000003 HC RX IP 258 OP 636: Performed by: INTERNAL MEDICINE

## 2022-02-02 PROCEDURE — 36415 COLL VENOUS BLD VENIPUNCTURE: CPT | Performed by: INTERNAL MEDICINE

## 2022-02-02 PROCEDURE — 92610 EVALUATE SWALLOWING FUNCTION: CPT | Mod: GN

## 2022-02-02 PROCEDURE — 84145 PROCALCITONIN (PCT): CPT | Performed by: INTERNAL MEDICINE

## 2022-02-02 PROCEDURE — 99232 SBSQ HOSP IP/OBS MODERATE 35: CPT | Performed by: INTERNAL MEDICINE

## 2022-02-02 PROCEDURE — 80053 COMPREHEN METABOLIC PANEL: CPT | Performed by: INTERNAL MEDICINE

## 2022-02-02 PROCEDURE — 83605 ASSAY OF LACTIC ACID: CPT | Performed by: INTERNAL MEDICINE

## 2022-02-02 PROCEDURE — 250N000011 HC RX IP 250 OP 636: Performed by: INTERNAL MEDICINE

## 2022-02-02 RX ORDER — PIOGLITAZONEHYDROCHLORIDE 30 MG/1
30 TABLET ORAL DAILY
COMMUNITY
Start: 2022-01-06

## 2022-02-02 RX ORDER — LATANOPROST 50 UG/ML
1 SOLUTION/ DROPS OPHTHALMIC AT BEDTIME
Status: DISCONTINUED | OUTPATIENT
Start: 2022-02-02 | End: 2022-02-04 | Stop reason: HOSPADM

## 2022-02-02 RX ORDER — TIMOLOL MALEATE 5 MG/ML
1 SOLUTION/ DROPS OPHTHALMIC EVERY MORNING
Status: DISCONTINUED | OUTPATIENT
Start: 2022-02-02 | End: 2022-02-04 | Stop reason: HOSPADM

## 2022-02-02 RX ORDER — BLOOD SUGAR DIAGNOSTIC
1 STRIP MISCELLANEOUS 2 TIMES DAILY
COMMUNITY

## 2022-02-02 RX ORDER — MULTIVIT WITH MINERALS/LUTEIN
1000 TABLET ORAL DAILY
COMMUNITY

## 2022-02-02 RX ORDER — VITAMIN B COMPLEX
1 TABLET ORAL DAILY
COMMUNITY

## 2022-02-02 RX ORDER — VIT C/B6/B5/MAGNESIUM/HERB 173 50-5-6-5MG
500 CAPSULE ORAL DAILY
COMMUNITY

## 2022-02-02 RX ORDER — TIMOLOL MALEATE 5 MG/ML
1 SOLUTION/ DROPS OPHTHALMIC EVERY MORNING
COMMUNITY
Start: 2021-08-13

## 2022-02-02 RX ORDER — LATANOPROST 50 UG/ML
1 SOLUTION/ DROPS OPHTHALMIC AT BEDTIME
COMMUNITY
Start: 2021-12-01

## 2022-02-02 RX ADMIN — AZITHROMYCIN MONOHYDRATE 500 MG: 500 INJECTION, POWDER, LYOPHILIZED, FOR SOLUTION INTRAVENOUS at 21:15

## 2022-02-02 RX ADMIN — ACETAMINOPHEN 975 MG: 325 TABLET, FILM COATED ORAL at 09:47

## 2022-02-02 RX ADMIN — ENOXAPARIN SODIUM 40 MG: 40 INJECTION SUBCUTANEOUS at 20:41

## 2022-02-02 RX ADMIN — ACETAMINOPHEN 975 MG: 325 TABLET, FILM COATED ORAL at 20:40

## 2022-02-02 RX ADMIN — GLIPIZIDE 10 MG: 5 TABLET ORAL at 14:55

## 2022-02-02 RX ADMIN — GUAIFENESIN AND CODEINE PHOSPHATE 10 ML: 10; 100 LIQUID ORAL at 20:42

## 2022-02-02 RX ADMIN — TIMOLOL MALEATE 1 DROP: 5 SOLUTION OPHTHALMIC at 11:45

## 2022-02-02 RX ADMIN — LATANOPROST 1 DROP: 50 SOLUTION OPHTHALMIC at 20:47

## 2022-02-02 RX ADMIN — ALLOPURINOL 200 MG: 100 TABLET ORAL at 08:18

## 2022-02-02 RX ADMIN — CEFTRIAXONE SODIUM 2 G: 2 INJECTION, SOLUTION INTRAVENOUS at 20:32

## 2022-02-02 RX ADMIN — SODIUM CHLORIDE: 9 INJECTION, SOLUTION INTRAVENOUS at 15:38

## 2022-02-02 RX ADMIN — SODIUM CHLORIDE: 9 INJECTION, SOLUTION INTRAVENOUS at 06:10

## 2022-02-02 ASSESSMENT — ACTIVITIES OF DAILY LIVING (ADL)
ADLS_ACUITY_SCORE: 13
ADLS_ACUITY_SCORE: 14
ADLS_ACUITY_SCORE: 11
ADLS_ACUITY_SCORE: 11
ADLS_ACUITY_SCORE: 14
ADLS_ACUITY_SCORE: 11
ADLS_ACUITY_SCORE: 13
ADLS_ACUITY_SCORE: 12
ADLS_ACUITY_SCORE: 13
ADLS_ACUITY_SCORE: 12
ADLS_ACUITY_SCORE: 13
ADLS_ACUITY_SCORE: 14
ADLS_ACUITY_SCORE: 14
ADLS_ACUITY_SCORE: 11
ADLS_ACUITY_SCORE: 13
ADLS_ACUITY_SCORE: 14
ADLS_ACUITY_SCORE: 12
ADLS_ACUITY_SCORE: 13
ADLS_ACUITY_SCORE: 12
ADLS_ACUITY_SCORE: 11
ADLS_ACUITY_SCORE: 11
ADLS_ACUITY_SCORE: 14
ADLS_ACUITY_SCORE: 11
ADLS_ACUITY_SCORE: 14

## 2022-02-02 NOTE — PROGRESS NOTES
02/02/22 1200   Signing Clinician's Name / Credentials   Signing clinician's name / credentials Quita Gallegos MS CF-SLP   Quick Adds   Rehab Discipline SLP   Quick Adds Speech Language Pathology   SLP - Dysphagia/Swallow   Symptoms Noted During/After Treatment None   Treatment Detail Patient seen this AM for clinical bedside swallow evaluation. Patient reports no current or prior history of difficulty swallowing solids or liquids. His oral musculature is adequate for swallow functions and he currently wears upper and lower dentures. Patient trialed thin liquids via cup and straw without demonstrating any overt signs/symptoms of aspiration. He  was observed to take single sips from both cup and straw. Patient trialed soft & bite sized and regular texture PO trials and he demonstrated adequate mastication and clearance of bolus from oral cavity. He did not demonstrate any overt signs/symptoms of aspiration during solid texture trials. Patient was observed to take small single bites during trials. Patient is independently tolerating  a regular diet with thin liquids and does not require diet modifications at this time.     SLP Discharge Planning    SLP Discharge Recommendation (DC Rec) home   SLP Rationale for DC Rec Patient is independently tolerating a regular diet.    SLP Brief overview of current status  Patient is independently tolerating  a regular diet with thin liquids and does not require diet modifications at this time. No overt signs/symptoms of aspiration were witnessed during evaluation. Will complete orders at this time. If patient beings to have increased difficulty with swallowing or he beings to demonstrate signs of aspiration, new orders should be place for SLP to re-assess.    Additional Documentation   SLP Plan Complete orders.    Total Session Time   Total Session Time (minutes) 15 minutes

## 2022-02-02 NOTE — PROGRESS NOTES
[facility]    Hospitalist Progress Note      Assessment & Plan   Urban Huizar is a 86 year old male who was admitted on 2/1/2022.  Patient is an 86-year-old gentleman who for the last couple of weeks is just not been feeling well he has had progressive cough shortness of breath poor appetite significant weakness.  Also fever last couple of days.  Has gotten the point he has a hard time even getting up moving around.  He was vaccinated for Covid.  [facility]  On presentation to the ER his O2 sats initially were 90% room air.  He was normotensive.  Had a fever 102.2.  He did end up requiring about 1 L just to keep her sats greater than 90%.  No syncopal episodes no peripheral edema.  No bleeding troubles at all just very poor appetite.    1.  Acute respiratory failure with hypoxia: Patient hypoxic likely from underlying pneumonia.  Smoking history long time ago.  But has had no real issues normally is doing quite well.  Sats are fine and currently on 1 L.    2.  Bilateral pneumonia: Prep somewhat atypical.  Does have an elevated procalcitonin is up to 3.48 today.  White count was 20,000 down to 17.9 currently.  X-ray shows subtle multifocal pneumonia.  He is Covid negative at this time.  Is on Rocephin and azithromycin.  His sputum has been obtained has had blood cultures performed.  No real wheezing currently.  We will continue with his current regimen.  See if or cultures help us identify specific bug.  Anticipate will take him a good couple of days before he starts to feel better.    3.  Diabetes mellitus type 2: He is on oral agents.  On sliding scale currently.  When he is eating fine without trouble I would just put him back on his oral meds.      4.  Cardiac status: Some history of hypertension but otherwise no known ischemic disease.  No evidence of any heart failure at all.  Normally states he is very active.  He has been revealed in by his wife lately because of  her concern of him following.  Usually puts down 12 cords of wood.    5.  He is status post subdural hematoma evacuation 2012.  Did not have a stroke.  He was left with some minor speech impediment apparently and word finding difficulty.  Did not have a specific stroke.    6.  Pharyngeal neck abscess: End up going to I believe [facility] and had this drained by interventional radiology.  This was back in August 2010.      Diet: Regular Diet Adult  Fluids: Saline 100 cc an hour    DVT Prophylaxis: Lovenox  Code Status: Special Code    Disposition: Expected discharge in 2-4 days once recovers from this illness afebrile.    Puneet Chapman    Interval History   Patient alert pleasant is feeling better already.  Says that just knocked him down he has been kind of laying around for the last 2 weeks.  Did not come in until he started having fevers.  Realized he probably should have come in a little sooner.  At any rate he did have fevers.  Is being treated for pneumonia.  Had elevated procalcitonin.  No evidence that this is a viral or Covid infection.  We will continue with IV fluids until he is eating better continue the IV antibiotics.  Titrate oxygen as we can.  I told him probably a good couple days before he starts to feel significantly better.    -Data reviewed today: I reviewed all new labs and imaging results over the last 24 hours. I personally reviewed no images or EKG's today.    Physical Exam   Temp: 99  F (37.2  C) Temp src: Tympanic BP: 145/61 Pulse: 81   Resp: 20 SpO2: 94 % O2 Device: Nasal cannula Oxygen Delivery: 1 LPM  Vitals:    02/01/22 1728 02/01/22 2101   Weight: 99.8 kg (220 lb) 98.3 kg (216 lb 11.4 oz)     Vital Signs with Ranges  Temp:  [99  F (37.2  C)-102.2  F (39  C)] 99  F (37.2  C)  Pulse:  [68-89] 81  Resp:  [20] 20  BP: (118-149)/(59-85) 145/61  SpO2:  [90 %-96 %] 94 %  I/O last 3 completed shifts:  In: 661 [I.V.:661]  Out: 575 [Urine:575]    Peripheral IV 02/01/22 Left Upper forearm  (Active)   Site Assessment WDL 02/02/22 0018   Line Status Infusing 02/02/22 0018   Phlebitis Scale 0-->no symptoms 02/02/22 0018   Infiltration Scale 0 02/02/22 0018   Number of days: 1       Peripheral IV 08/19/19 Right Upper forearm (Active)   Number of days: 898       ETT (adult) 7 (Active)   Number of days: 898       Urethral Catheter 16 fr (Active)   Number of days: 898       Incision/Surgical Site 02/26/18 Left Eye (Active)   Number of days: 1437       Incision/Surgical Site Inner Throat (Active)   Number of days:      No line/device    Constitutional: Alert and oriented x3. No distress    HEENT: Normocephalic/atraumatic, PERRL, EOMI, mouth moist, neck supple, no LN.     Cardiovascular: RRR. no Murmur, no  rubs, or gallops.  JVD no.  Bruits no.  Pulses 2+    Respiratory: Very loose spastic cough.  Some scattered crackles and rhonchi bilaterally rare wheeze.  No obvious consolidation.      Abdomen: Soft, nontender, nondistended, no organomegaly. Bowel sounds present    Extremities: Warm/dry.  No edema    Neuro:   Non focal, cranial nerves intact, Moves all extremities.  Medications     sodium chloride 100 mL/hr at 02/02/22 0610       allopurinol  200 mg Oral Daily     azithromycin  500 mg Intravenous Q24H     cefTRIAXone  2 g Intravenous Q24H     enoxaparin ANTICOAGULANT  40 mg Subcutaneous Q24H     [Held by provider] glipiZIDE  10 mg Oral BID AC     guaiFENesin-codeine  10 mL Oral At Bedtime     sodium chloride (PF)  3 mL Intracatheter Q8H       Data   Recent Labs   Lab 02/02/22  0850 02/02/22  0512 02/01/22  1801   WBC  --  17.9* 22.2*   HGB  --  12.4* 13.9   MCV  --  83 82   PLT  --  325 361   NA  --  130* 127*   POTASSIUM  --  4.0 4.2   CHLORIDE  --  96 92*   CO2  --  29 26   BUN  --  20 22   CR  --  0.74 0.79   ANIONGAP  --  5 9   SANJAY  --  8.3* 8.7   * 154* 243*   ALBUMIN  --  1.9* 2.3*   PROTTOTAL  --  5.8* 6.7*   BILITOTAL  --  0.6 0.7   ALKPHOS  --  64 77   ALT  --  32 41   AST  --  19 25        Recent Results (from the past 24 hour(s))   XR Chest Port 1 View    Narrative    PROCEDURE:  XR CHEST PORT 1 VIEW    HISTORY: fever and cough. .    COMPARISON:  8/19/2019    FINDINGS:    The cardiomediastinal contours are stable.  Bilateral patchy airspace consolidation is new. No effusion or  pneumothorax.      Impression    IMPRESSION:  Multifocal pneumonia. Recommend follow-up.      NADIA BARON MD         SYSTEM ID:  [id number]   XR Chest 2 Views    Narrative    PROCEDURE:  XR CHEST 2 VW    HISTORY: bilateral pneumonia., .    COMPARISON:  122    FINDINGS:  The cardiomediastinal contours are stable. The trachea is midline.  Multifocal airspace consolidation persists. No effusion or  pneumothorax.    No suspicious osseous lesion or subdiaphragmatic free air.      Impression    IMPRESSION:      Persistent multifocal pneumonia.      NADAI BARON MD         SYSTEM ID:  [id number]

## 2022-02-02 NOTE — PLAN OF CARE
"/64   Pulse 81   Temp 99.1  F (37.3  C) (Tympanic)   Resp 20   Ht 1.854 m (6' 1\")   Wt 98.3 kg (216 lb 11.4 oz)   SpO2 94%   BMI 28.59 kg/m      Pt is A&O x4. Denies pain. ALTAMIRANO, remains on 1L NC with sats 90-92%. Frequent cough, productive at times. IVF @ 100/hr. Fine crackles to BLL and RML. Up with A1. Call light in reach and patient makes needs known.     Face to face report given with opportunity to observe patient.    Report given to ELLE Doll RN   2/2/2022  7:20 AM    "

## 2022-02-02 NOTE — PLAN OF CARE
"Prior to Admission Medication Reconciliation:     Medications added:   [] None  [] As listed below:    Latanoprost    timoptic    Medications deleted:   [] None  [x] As listed below: from 2019 hospitalization  chlorhexidine (PERIDEX) 0.12 % solution Take 15 mLs by mouth every 12 hours   dextrose 50 % injection Inject 25-50 mLs into the vein every 15 minutes as needed for low blood sugar   glucagon 1 MG SOLR injection Inject 1 mg Subcutaneous every 15 minutes as needed for low blood sugar (May repeat x 1 only)   glucose 40 % (400 mg/mL) gel Take 15-30 g by mouth every 15 minutes as needed for low blood sugar   ipratropium - albuterol 0.5 mg/2.5 mg/3 mL (DUONEB) 0.5-2.5 (3) MG/3ML neb solution Take 1 vial (3 mLs) by nebulization every 4 hours as needed for shortness of breath / dyspnea   ondansetron (ZOFRAN) 2 MG/ML SOLN injection Inject 2 mLs (4 mg) into the vein every 6 hours as needed for nausea or vomiting   piperacillin-tazobactam (ZOSYN) 3-0.375 GM/50ML infusion Inject 50 mLs (3.375 g) into the vein every 6 hours   propofol (DIPRIVAN) 1000 MG/100ML infusion Inject 5-75 mcg/kg/min into the vein continuous   propofol (DIPRIVAN) 200 MG/20ML injection Inject 1-2 mLs (10-20 mg) into the vein every 30 minutes as needed (Agitation while Intubated)   sodium chloride, PF, 0.9% PF flush 3 mLs by Intracatheter route       Medications marked for review/removal by attending:  [x] None  [] As listed below:    Changes made to existing medications:   [] None  [x] Updated strengths and frequencies to most current  [] As listed below:    Last times/dates taken verified with patient:  [x] Yes- completed myself: pt last took meds \"a couple days ago\"  [] Prepared PTA medlist for review only. (will not be available to review personally)  [] Did not review with patient. Rx verification only. Review completed by nursing.    [] Nurse completed no changes made (double checked entries)  [] Unable to review with patient at this time:  [] " Nurse completed/changes made:      Allergies listed at another location:  [x]Allergies match allergies listed in Epic (nkda)  []Other allergies listed:    Allergy review:    [x]Did not review: reviewed by nursing  []Did not review: pt unable at this time  []Patient/MAR verified NKDA  []Patient/MAR verified current existing allergies: no changes made  []Patient confirmed current existing allergies and new allergies added:        Medication reconciliation sources:   [x]Patient  [x]Patient family member/emergency contact: spouse  [][facility] Review  []Epic Chart Review  []Care Everywhere review  [x]Pharmacy med list: [facility]   Name Strength Instructions DATE TAKEN TIME TAKEN Notes   [x] allopurinol 100 mg 1 tabs daily  12/14/21 180/90    [x] Ascorbic acid 500 mg 2 tabs BID [facility]     [x] Vit d   Daily  [facility]     [] empagliflozin  25 mg  1/2 tab daily  11/20/21 45/90    [x] glipizide 10 mg  1 tab BID 30 mins before breakfast and noon meal  12/1/21 180/90    [x] Glucosamine  2000 mg BID [facility]     [] Marine lipid  1200 mg  BID  NON [facility]     [x] Metformin  1000 mg  BID wc 12/14/21  180/90    [x] preservision areds 2   Daily  NON [facility]     [x] Pioglitazone  30 mg Daily  1/6/22 30/30    [x] accu-chek guide test strips  1 strip BID 7/28/21 50/90      []Pharmacy phone call  []Outside meds dispense report: see below  []Nursing home or Assisted Living MAR:  [x]Other: handwritten note    Glipizide 10 mg 2 am/ 1 pm (per wife if [facility] med[facility] says 1 tab BID, that is correct)    Allopurinol 100 mg 2 am     Pioglitazone 45 mg 1 am (per wife 30 mg [facility] at [facility] correct)     Metformin 1000 mg 1 am 1 pm    Omega 3 100 mg 2 per day (per wife, likely 1000 mg)    Glucosamine 100 2 per day (per wife, likely 1000 mg)    Vit c 1000 mg daily     Vit E 450 units daily     D2 1000 units daily     Turmeric 500 mg daily     Cinnamon 1000 mg daily     Pharmacy desired at discharge: [facility]    Is patient on coumadin?  [x]No    Requests for  consultation by provider or pharmacist:   [x] Patient understands why all of their meds were prescribed and how to take them. No questions.   [] Managing party has no questions.   [] Patient/ managing party has questions about the following:  [] Did not review with patient. Cannot assess.     Fill dates and reported compliancy:  [x] Fill dates coincide with reported compliancy for all/most maintenance meds.   [] Fill dates do not coincide with compliancy with maintenance meds. See notes in PTA medlist and in comments.    [] Fill dates do not coincide with the following medications but pt reports compliancy:  [] Did not review with patient. Cannot assess.     Historian accuracy:  [] Excellent- alert and oriented, understands why meds were prescribed and how to take, able to answer specifics  [] Good- alert and oriented, understands why meds were prescribed and how to take, some confusion   [x] Fair- alert and oriented, doesn't know medications without list, cannot answer specifics about medications, but has a decent process for which to take at home  [] Poor- does not know medications, may not have a process to take at home, may be cognitively unable to review at this time  []Medication management done by family member or facility, no concerns about historian accuracy.   [] Did not review with patient. Cannot assess.     Medication Management:  [x] Manages meds independently: spouse assists  [] Family member/ other party manages meds/assists:  [] Meds managed by staff at facility  [] Meds set up by home care, family/other party helps administer  [] Meds set up by home care, self administers  [] Did not review with patient. Cannot assess.     Other medications aside from PTA:  [x] Denies taking any medications aside from those listed in PTA meds  [] Reports taking another medication(s) but cannot recall the name(s)  [] Refuses to say.  [] Did not review with patient. Cannot assess.     Comments: none    Sana Sanchez  on 2/2/2022 at 9:24 AM       Discrepancies: [x] No []Not Applicable []Yes: listed below    Issues completing PTA medication reconciliation:  [] On hold for a long time  [] Waited for a call back  [] Fax didn't come through  [] Fax took a long time  [] Other:    Notifying appropriate party of changes/additions/discrepancies:  []No pertinent changes made, notification not necessary.   [x] Notified attending provider via text page/phone call  [] Notified attending provider in person  [] Notified pharmacy  [] Notified nurse  [] Medications have not been reconciled by a provider yet, notification not necessary  [] Pt is not admitted to floor yet, PTA meds completed before admission.   Medications Prior to Admission   Medication Sig Dispense Refill Last Dose     allopurinol (ZYLOPRIM) 100 MG tablet Take 200 mg by mouth daily    Past Week at Unknown time     ascorbic acid 1000 MG TABS tablet Take 1,000 mg by mouth daily    Past Week at Unknown time     blood glucose (ACCU-CHEK GUIDE) test strip 1 strip 2 times daily or as directed.   Unknown at Unknown time     cinnamon 500 MG TABS Take 1,000 mg by mouth daily   Past Week at Unknown time     glipiZIDE (GLUCOTROL) 10 MG tablet Take 10 mg by mouth 2 times daily (before meals) . Take before breakfast and noon meal.   Past Week at Unknown time     Glucosamine Sulfate 1000 MG TABS Take 2,000 mg by mouth daily    Past Week at Unknown time     latanoprost (XALATAN) 0.005 % ophthalmic solution Place 1 drop into both eyes At Bedtime    Past Week at Unknown time     metFORMIN (GLUCOPHAGE) 1000 MG tablet Take 1,000 mg by mouth 2 times daily (with meals)    Past Week at Unknown time     omega-3 acid ethyl esters (LOVAZA) 1 G capsule Take 2 g by mouth daily    Past Week at Unknown time     pioglitazone (ACTOS) 30 MG tablet Take 30 mg by mouth daily    Past Week at Unknown time     timolol maleate (TIMOPTIC) 0.5 % ophthalmic solution Place 1 drop into both eyes every morning    Past  Week at Unknown time     Turmeric 500 MG CAPS Take 500 mg by mouth daily   Past Week at Unknown time     Vitamin D3 (CHOLECALCIFEROL) 25 mcg (1000 units) tablet Take 1 tablet by mouth daily   Past Week at Unknown time     vitamin E (TOCOPHEROL) 1000 units (450 mg) CAPS capsule Take 1,000 Units by mouth daily   Past Week at Unknown time               Medication Dispense History (from 2/2/2021 to 2/2/2022)  Expand All  Collapse All    Allopurinol     Dispensed Days Supply Quantity Provider Pharmacy   ALLOPURINOL  TAB 100MG 12/14/2021 90 180 Units [facility]...   ALLOPURINOL  TAB 100MG 07/28/2021 90 180 Units [facility]...   ALLOPURINOL 100MG TAB 05/20/2021 90 180 tablet [facility]...   ALLOPURINOL  TAB 100MG 03/12/2021 90 180 Units [facility]...           Empagliflozin     Dispensed Days Supply Quantity Provider Pharmacy   JARDIANCE    TAB 25MG 09/15/2021 90 45 Units [facility]...   JARDIANCE 25MG TAB 05/07/2021 90 [facility]   JARDIANCE    TAB 25MG 02/06/2021 90 45 each [facility]...           Glucose Blood     Dispensed Days Supply Quantity Provider Pharmacy   ACCU-CHEK GUIDE GUIDE PIPPA 07/25/2021 90 50 [facility]           HYDROcodone-Acetaminophen     Dispensed Days Supply Quantity Provider Pharmacy   HYDROCODONE/ACETAMINOPHEN 5-325 TB 09/02/2021 3 20 Units [facility]...           Latanoprost     Dispensed Days Supply Quantity Provider Pharmacy   LATANOPROST 0.005% OPHTH SOLN 2.5ML 12/01/2021 75 7.5 mL [facility]...   LATANOPROST 0.005% OPHTH SOLN 2.5ML 11/03/2021 35 2.5 mL [facility]...   LATANOPROST 0.005% OPHTH SOLN 2.5ML 10/01/2021 35 2.5 mL [facility]...   LATANOPROST 0.005% [facility]  SOLN 2.5ML 09/01/2021 35 2.5 mL [facility]...   LATANOPROST 0.005% OPHTH SOLN 2.5ML 08/13/2021 35 2.5 mL [facility]...   LATANOPROST 0.005% OPHTH SOLN 2.5ML 07/12/2021 70 5 mL [facility]...   LATANOPROST 0.005% OPHTH SOLN 2.5ML 06/02/2021 35 2.5 mL [facility]...   LATANOPROST 0.005% OPHTH SOLN 2.5ML 05/21/2021 35 2.5 mL [facility]...   LATANOPROST 0.005% OPHTH SOLN 2.5ML 04/13/2021 70 5 mL [facility]...           Pioglitazone HCl     Dispensed Days Supply Quantity Provider Pharmacy   PIOGLITAZONE HYDROCHLORID 30MG TAB 01/06/2022 30 [facility]   PIOGLITAZONE HYDROCHLORID 30MG TAB 11/22/2021 30 30 [facility]   PIOGLITAZONE HYDROCHLORID 30MG TAB 09/25/2021 30 30 tablet [facility]   PIOGLITAZONE HYDROCHLORID 30MG TAB 09/02/2021 30 30 [facility]   PIOGLITAZONE HCL 45MG TAB 08/19/2021 30 30 tablet [facility]   PIOGLITAZONE HCL 45MG TAB 07/21/2021 30 30 tablet [facility]   PIOGLITAZONE HCL 45MG TAB 06/15/2021 30 30 tablet [facility]   PIOGLITAZONE HCL 45MG TAB 05/17/2021 30 30 tablet [facility]   PIOGLITAZONE HCL 45MG TAB 04/20/2021 30 30 tablet [facility]   PIOGLITAZONE HCL 45MG TAB 03/28/2021 30 30 tablet [facility]   PIOGLITAZONE HCL 45MG TAB 03/05/2021 30 30 tablet [facility]...           Timolol Maleate     Dispensed Days Supply Quantity Provider Pharmacy   TIMOLOL MALEATE 0.5% OPHTH SOLN 5ML 08/13/2021 90 10 mL [facility]...   TIMOLOL MALEATE 0.5% OPHTH SOLN 5ML 05/20/2021 90 10 mL [facility] #...   TIMOLOL MALEATE 0.5% OPTH SOL 15ML 02/08/2021 90 15 mL [facility] #...           glipiZIDE     Dispensed Days Supply Quantity Provider Pharmacy   GLIPIZIDE    TAB 10MG 12/01/2021 90 180 Units [facility]...   GLIPIZIDE    TAB 10MG 05/24/2021 90 180 Units [facility]...   GLIPIZIDE    TAB 10MG 03/17/2021 90 270 Units [facility]...           metFORMIN HCl     Dispensed Days Supply Quantity Provider Pharmacy   METFORMIN    TAB 1000MG 12/09/2021 90 180 Units [facility]...   METFORMIN    TAB 1000MG 09/23/2021 90 180 Units [facility]...   METFORMIN    TAB 1000MG 06/04/2021 90 180 Units [facility]...   METFORMIN    TAB 1000MG 03/12/2021 90 180 Units [facility]...           Disclaimer    Certain dispenses may not be available or accurate in this report, including over-the-counter medications, low cost prescriptions, prescriptions paid for by the patient or non-participating sources, or errors in insurance claims information. The provider should independently verify medication history with the patient.     External Sources

## 2022-02-02 NOTE — PLAN OF CARE
"Reason for hospital stay:  Multifocal pneumonia   Living situation PTA: Lives at home with his wife.   Most recent vitals: /65 (BP Location: Right arm, Patient Position: Semi-Knight's)   Pulse 89   Temp 100.4  F (38  C) (Tympanic)   Resp 20   Ht 1.854 m (6' 1\")   Wt 98.3 kg (216 lb 11.4 oz)   SpO2 (!) 91%   BMI 28.59 kg/m      Pain Management:  Pt denies having pain.   LOC:  Alert and orientated to person, place, time, and situation.   Cardiac:  Apical pulse regular. On telemetry.   Respiratory:  All fields clear, equal bilateral. Frequent, loose, productive cough. Sputum is green and thick. Dyspnea when talking and moving. No use of accessory muscles. No use of abdominal muscles. Robitussin AC was given for cough. 1 LPM O2. Sats in the 90's.   GI:  WDL   :  WDL   Skin Issues:  Scattered bruises and abrasions throughout. Skin is pale, dry, and flaky.     IVF:  IV Zithromax and IV Rocephin   ABX:  None     Nutrition: Adequate. He said he was hungry but he is NPO.   Ambulation: Assist x 1. Use of gait belt. pt's gait needs to be assessed. He did not walk for me once he got up to his room.   Safety:  Bed in the low position, call light within reach, personal items within reach, free from falls, uses the call light appropriately, and makes needs known.     Comments: Pt came to the ER with decreased oral intake, generalized weakness, and productive cough. Wife brought pt in. Vitals stable and afebrile. Hx of HTN, gout, hyponatremia, Remote CVA, and DM type 2.   Face to face report given with opportunity to observe patient.    Report given to ELLE Heaton.     Esha Wilson RN   2/2/2022  12:16 AM          "

## 2022-02-02 NOTE — ED NOTES
(Rel.) Home Phone Work Phone Mobile Phone   Sheila Huizar (Spouse) [phone]       Pt wife would like to be called with updates as nursing staff is able. She did leave from the waiting area.

## 2022-02-02 NOTE — H&P
[facility]    History and Physical - Hospitalist Service       Date of Admission:  2/1/2022    Assessment & Plan      Urban Huizar is a 86 year old male admitted on 2/1/2022. He presents to ED from home via ambulance with progressing cough, dyspnea (started a few weeks prior admission) and fever (started today). 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 Clinically looks better than his recorded labs and XR. Special code (intubation OK, but no electrical shock, chest compressions OK).     Hospital problem list:   1. Bilateral pneumonia. Very atypical. His looks less sick one should expect with such labs and XR abnormality. Will do pneumonia w/u. Will repeat XR in AM. Very likely will get CT chest done tomorrow. Continue Abx, nebs, O2. F/u sputum cultures    2. Gout. Well controlled. No recent flairs.     3. DM-2. Will hold oral antiglycemics because he is NPO until cleared by speech pathology. Will use insulin sliding scale for now.     4. Hyponatremia. Mild and chronic. Most likely due to poor osmolar diet. Na levels are not dangerously low. Will monitor.  ml till AM and re-assess.     5. Remote CVA. Will get speech pathology evaluation to r/o aspiration.      Diet: NPO for Medical/Clinical Reasons Except for: Ice Chips, Meds    DVT Prophylaxis: Enoxaparin (Lovenox) SQ  Antonio Catheter: not present.  Central Lines: None  Cardiac Monitoring: ACTIVE order. Indication: multifocal pneumonia, hyponatremia  Code Status:   special: intubation OK, pressors OK, chest compression OK but not defibrillation.     Clinically Significant Risk Factors Present on Admission         # Hyponatremia: Na = 127 mmol/L (Ref range: 133 - 144 mmol/L) on admission, will monitor as appropriate        # Overweight: last Body mass index is 28.59 kg/m .      Disposition Plan   Expected Discharge: in a few days when stable.      The patient's care was discussed with the  on call team and nursing staff Team.    Lul Arriaga MD  Hospitalist Service  [facility]  Securely message with the [employer] Web Console (learn more here)  Text page via [facility] Paging/Directory         ______________________________________________________________________    Chief Complaint   Cough, fever, weakness, dyspnea.     History is obtained from the patient, electronic health record and emergency department physician    History of Present Illness   Urban Huizar is a 86 year old male who has h/o remote CVA (hemorrhagic), gout, DM-2, tongue abscess, presents to ED for evaluation of worsening dyspnea, cough and weakness. Today he had a fever and this was a trigger event to present to ED. Patient is poor historian and cannot provide any specifics regarding his symptoms. He admits decreased oral intake and difficulty ambulating. Fever and chills today. Fever up to 102. Denies chest pain, headache, focal weakness, GI, urinary or neurological complains. He only complaints significant weakness, ALTAMIRANO and productive cough. He is vaccinated with 2 doses of unknown vaccine ([facility] is [facility]).   He presents to ED febrile, normotensive and normocardic. Saturation 90% on RA.   ED work up:  Na 127  Ferritin 560   Procalcitonin 1.64 albumin 2.3 WBC 22.2 Hg 13.9   Chest XR: shows bilateral infiltrates.   Influenza A, B and Covid 19 tests negative.   eD treatment and other tests: blood cultures and sputum cultures obtained.   Abx given: Rocephin, Azithromycin.     Review of Systems    The 10 point Review of Systems is negative other than noted in the HPI.    Past Medical History    I have reviewed this patient's medical history and updated it with pertinent information if needed.   See HPI.     Past Surgical History   I have reviewed this patient's surgical history and updated it with pertinent information if needed.  Past Surgical History:   Procedure Laterality Date     INCISION AND  DRAINAGE ORAL, COMBINED N/A 2019    Procedure: INCISION AND DRAINAGE CENTRAL TONGUE ABSCESS Direct Laryngoscopy;  Surgeon: Madie Lew MD;  Location: [facility] OR     PHACOEMULSIFICATION WITH STANDARD INTRAOCULAR LENS IMPLANT Left 2018    Procedure: PHACOEMULSIFICATION WITH STANDARD INTRAOCULAR LENS IMPLANT;  PHACOEMULSIFICATION CATARACT EXTRACTION POSTERIOR CHAMBER LENS LEFT;  Surgeon: Baljinder Shannon MD;  Location: [facility] OR       Social History   I have reviewed this patient's social history and updated it with pertinent information if needed.  Social History     Tobacco Use     Smoking status: Former Smoker     Packs/day: 4.00     Years: 30.00     Pack years: 120.00     Types: Cigarettes     Start date: 1946     Quit date: 1977     Years since quittin.9     Smokeless tobacco: Never Used   Substance Use Topics     Alcohol use: Yes     Comment: rarely     Drug use: No       Family History     No significant family history, including no history of: malignancy    Prior to Admission Medications   Prior to Admission Medications   Prescriptions Last Dose Informant Patient Reported? Taking?   ALLOPURINOL PO   Yes No   Sig: Take 200 mg by mouth daily    Ascorbic Acid (VITAMIN C PO)   Yes No   Sig: Take 1,000 mg by mouth 2 times daily    GLIPIZIDE PO   Yes No   Sig: Take 10 mg by mouth Take 2 tablets by mouth every day and take one tablet every evening to decrease blood sugar.  Take 30 minutes before meal.   GLUCOSAMINE SULFATE PO   Yes No   Sig: Take 2,000 mg by mouth 2 times daily    METFORMIN HCL PO   Yes No   Sig: Take 1,000 mg by mouth 2 times daily (with meals)   PIOGLITAZONE HCL PO   Yes No   Sig: Take 45 mg by mouth daily    UNABLE TO FIND   Yes No   Si,000 mg daily Cinnamon    UNABLE TO FIND   Yes No   Si mg daily Tumeric    VITAMIN D, CHOLECALCIFEROL, PO   Yes No   Sig: Take 5,000 Units by mouth daily   VITAMIN E NATURAL PO   Yes No   Sig: Take 1,000 Units by mouth    chlorhexidine (PERIDEX) 0.12 % solution   No No   Sig: Take 15 mLs by mouth every 12 hours   dextrose 50 % injection   No No   Sig: Inject 25-50 mLs into the vein every 15 minutes as needed for low blood sugar   glucagon 1 MG SOLR injection   No No   Sig: Inject 1 mg Subcutaneous every 15 minutes as needed for low blood sugar (May repeat x 1 only)   glucose 40 % (400 mg/mL) gel   No No   Sig: Take 15-30 g by mouth every 15 minutes as needed for low blood sugar   ipratropium - albuterol 0.5 mg/2.5 mg/3 mL (DUONEB) 0.5-2.5 (3) MG/3ML neb solution   No No   Sig: Take 1 vial (3 mLs) by nebulization every 4 hours as needed for shortness of breath / dyspnea   omega-3 acid ethyl esters (LOVAZA) 1 G capsule   Yes No   Sig: Take 1,200 mg by mouth 2 times daily    ondansetron (ZOFRAN) 2 MG/ML SOLN injection   No No   Sig: Inject 2 mLs (4 mg) into the vein every 6 hours as needed for nausea or vomiting   piperacillin-tazobactam (ZOSYN) 3-0.375 GM/50ML infusion   No No   Sig: Inject 50 mLs (3.375 g) into the vein every 6 hours   propofol (DIPRIVAN) 1000 MG/100ML infusion   Yes No   Sig: Inject 5-75 mcg/kg/min into the vein continuous   propofol (DIPRIVAN) 200 MG/20ML injection   No No   Sig: Inject 1-2 mLs (10-20 mg) into the vein every 30 minutes as needed (Agitation while Intubated)   sodium chloride, PF, 0.9% PF flush   Yes No   Sig: 3 mLs by Intracatheter route      Facility-Administered Medications: None     Allergies   No Known Allergies    Physical Exam   Vital Signs: Temp: 100.4  F (38  C) Temp src: Tympanic BP: 140/65 Pulse: 89   Resp: 20 SpO2: (!) 91 % O2 Device: Nasal cannula Oxygen Delivery: 1 LPM  Weight: 216 lbs 11.39 oz    General Appearance: not in distress.   Eyes: no icterus  HEENT: OP clear, uvula midline  Respiratory: both lungs with crackles, no wheezing.   Cardiovascular: regular, no M/G/R  GI: not distended, soft, not tender.   Lymph/Hematologic: no regional LAD, no rash  Genitourinary: no Antonio  Skin:  no rash  Musculoskeletal: normal exam.   Neurologic: mild dysarthria from an old stroke. No focal weakness.   Psychiatric: normal affect. Alert, oriented x 4    Data   Data reviewed today: I reviewed all medications, new labs and imaging results over the last 24 hours. I personally reviewed the EKG tracing showing NSR and the chest x-ray image(s) showing bilateral pneumonia.    Recent Labs   Lab 02/01/22  1801   WBC 22.2*   HGB 13.9   MCV 82      *   POTASSIUM 4.2   CHLORIDE 92*   CO2 26   BUN 22   CR 0.79   ANIONGAP 9   SANJAY 8.7   *   ALBUMIN 2.3*   PROTTOTAL 6.7*   BILITOTAL 0.7   ALKPHOS 77   ALT 41   AST 25     22.2 (H)    \    13.9    /    361   N 92    L N/A    127 (L)    92 (L)    22 /   ------------------------------------ 243 (H)   ALT 41   AST 25   AP 77   ALB 2.3 (L)   Ca 8.7  4.2    26    0.79 \    % RETIC N/A      Troponin N/A    BNP N/A    CK N/A  INR N/A   PTT N/A    D-dimer 1.96 (H)    Fibrinogen N/A    Antithrombin N/A  Ferritin 560 (H)  .0 (H)    IL-6 N/A  Recent Results (from the past 24 hour(s))   XR Chest Port 1 View    Narrative    PROCEDURE:  XR CHEST PORT 1 VIEW    HISTORY: fever and cough. .    COMPARISON:  8/19/2019    FINDINGS:    The cardiomediastinal contours are stable.  Bilateral patchy airspace consolidation is new. No effusion or  pneumothorax.      Impression    IMPRESSION:  Multifocal pneumonia. Recommend follow-up.      NADIA BARON MD         SYSTEM ID:  [id number]

## 2022-02-02 NOTE — PLAN OF CARE
Pt is alert, and orientated x 4 this shift. Pt  has low grade temperature this shift, and receives PRN tylenol, with relief. Pt denies pain this shift. Pt complaints of frequent, productive cough this shift. Pt titrated down this shift from 1 LPM nasal cannula to Room air, and O2 remains in Mid 90's. Blood glucose is 153 prior to breakfast, and 222 prior to lunch this shift. Pt blood sugar 256 at 14:53. Pt receives 10 mg Glipizide, per home ordered dose.  Pt stands at bedside with 1 assist to use urinal. Pt uses call light appropriately.    Face to face report given with opportunity to observe patient.  Report given to ELLE Hamilton.    Bambi Vizcarra RN  2/2/2022, 4:15 PM

## 2022-02-02 NOTE — ED PROVIDER NOTES
History     Chief Complaint   Patient presents with     Generalized Weakness     The history is provided by the patient.     Urban Huizar is a 86 year old male who presented to the emergency department via EMS for evaluation of a several day to several week history of worsening cough with production as well as weakness.  Patient has had decreased oral intake as well as difficulty ambulating.  Found to have chills and a fever this morning.  On arrival the patient was noted of a fever of 102.  Denies any abdominal discomfort.  Denies any vomiting.  Denies any unusual rashes or skin changes.  Denies any hematochezia, melena, hematemesis.  Denies any lower urinary tract symptoms.  Denies any headache.  Complains of profound weakness, dyspnea with exertion, and cough production.    Allergies:  No Known Allergies    Problem List:    Patient Active Problem List    Diagnosis Date Noted     Acute respiratory failure with hypoxia (H) 02/01/2022     Priority: Medium     Multifocal pneumonia 02/01/2022     Priority: Medium     Abscess of neck 08/19/2019     Priority: Medium     Type 2 diabetes mellitus without complication, without long-term current use of insulin (H) 08/19/2019     Priority: Medium     Benign essential hypertension 08/19/2019     Priority: Medium     Hyponatremia 08/19/2019     Priority: Medium     Neck abscess 08/19/2019     Priority: Medium        Past Medical History:    No past medical history on file.    Past Surgical History:    Past Surgical History:   Procedure Laterality Date     INCISION AND DRAINAGE ORAL, COMBINED N/A 8/19/2019    Procedure: INCISION AND DRAINAGE CENTRAL TONGUE ABSCESS Direct Laryngoscopy;  Surgeon: Madie Lew MD;  Location: [facility] OR     PHACOEMULSIFICATION WITH STANDARD INTRAOCULAR LENS IMPLANT Left 2/26/2018    Procedure: PHACOEMULSIFICATION WITH STANDARD INTRAOCULAR LENS IMPLANT;  PHACOEMULSIFICATION CATARACT EXTRACTION POSTERIOR CHAMBER LENS LEFT;  Surgeon:  Baljinder Shannon MD;  Location: [facility] OR       Family History:    No family history on file.    Social History:  Marital Status:   [2]  Social History     Tobacco Use     Smoking status: Former Smoker     Packs/day: 4.00     Years: 30.00     Pack years: 120.00     Types: Cigarettes     Start date: 1946     Quit date: 1977     Years since quittin.9     Smokeless tobacco: Never Used   Substance Use Topics     Alcohol use: Yes     Comment: rarely     Drug use: No        Medications:    ALLOPURINOL PO  Ascorbic Acid (VITAMIN C PO)  chlorhexidine (PERIDEX) 0.12 % solution  dextrose 50 % injection  GLIPIZIDE PO  glucagon 1 MG SOLR injection  GLUCOSAMINE SULFATE PO  glucose 40 % (400 mg/mL) gel  ipratropium - albuterol 0.5 mg/2.5 mg/3 mL (DUONEB) 0.5-2.5 (3) MG/3ML neb solution  METFORMIN HCL PO  omega-3 acid ethyl esters (LOVAZA) 1 G capsule  ondansetron (ZOFRAN) 2 MG/ML SOLN injection  PIOGLITAZONE HCL PO  piperacillin-tazobactam (ZOSYN) 3-0.375 GM/50ML infusion  propofol (DIPRIVAN) 1000 MG/100ML infusion  propofol (DIPRIVAN) 200 MG/20ML injection  sodium chloride, PF, 0.9% PF flush  UNABLE TO FIND  UNABLE TO FIND  VITAMIN D, CHOLECALCIFEROL, PO  VITAMIN E NATURAL PO          Review of Systems   Constitutional: Positive for activity change, appetite change, chills, fatigue and fever.   HENT: Positive for congestion. Negative for rhinorrhea, sinus pressure and sinus pain.    Eyes: Negative for photophobia and visual disturbance.   Respiratory: Positive for cough and shortness of breath. Negative for chest tightness.    Cardiovascular: Negative for chest pain.   Gastrointestinal: Negative for abdominal pain, nausea and vomiting.   Genitourinary: Negative.    Musculoskeletal: Negative for back pain, neck pain and neck stiffness.   Skin: Negative.    Neurological: Positive for dizziness, weakness and light-headedness. Negative for headaches.       Physical Exam   BP: 147/75  Pulse: 68  Temp: (!) 102.2  F  (39  C)  Resp: 20  Weight: 99.8 kg (220 lb) (At home wt 1/29/22)  SpO2: (S) (!) 90 % (O2 applied)      Physical Exam  Vitals and nursing note reviewed.   Constitutional:       General: He is not in acute distress.     Appearance: Normal appearance. He is normal weight. He is ill-appearing. He is not toxic-appearing or diaphoretic.   HENT:      Nose: Congestion present.      Mouth/Throat:      Mouth: Mucous membranes are moist.   Eyes:      Comments: Some mild bilateral conjunctival injection.   Cardiovascular:      Rate and Rhythm: Normal rate and regular rhythm.   Pulmonary:      Comments: Expiratory rhonchi bilaterally.  He has no significant tachypnea, increased work of breathing, respiratory distress.  Placed on 1 L of nasal cannula in the emergency department.  Abdominal:      General: There is no distension.      Palpations: Abdomen is soft.      Tenderness: There is no abdominal tenderness. There is no guarding.   Musculoskeletal:      Cervical back: Normal range of motion and neck supple.      Right lower leg: No edema.      Left lower leg: No edema.   Skin:     General: Skin is warm and dry.      Capillary Refill: Capillary refill takes less than 2 seconds.   Neurological:      General: No focal deficit present.      Mental Status: He is alert and oriented to person, place, and time.   Psychiatric:         Mood and Affect: Mood normal.         ED Course                 Procedures              Critical Care time:  none               Results for orders placed or performed during the hospital encounter of 02/01/22 (from the past 24 hour(s))   CBC with platelets differential    Narrative    The following orders were created for panel order CBC with platelets differential.  Procedure                               Abnormality         Status                     ---------                               -----------         ------                     CBC with platelets and d...[[id number]]  Abnormal            Final  result                 Please view results for these tests on the individual orders.   Comprehensive metabolic panel   Result Value Ref [facility]    Sodium 127 (L) 133 - 144 mmol/L    Potassium 4.2 3.4 - 5.3 mmol/L    Chloride 92 (L) 94 - 109 mmol/L    Carbon Dioxide (CO2) 26 20 - 32 mmol/L    Anion Gap 9 3 - 14 mmol/L    Urea Nitrogen 22 7 - 30 mg/dL    Creatinine 0.79 0.66 - 1.25 mg/dL    Calcium 8.7 8.5 - 10.1 mg/dL    Glucose 243 (H) 70 - 99 mg/dL    Alkaline Phosphatase 77 40 - 150 U/L    AST 25 0 - 45 U/L    ALT 41 0 - 70 U/L    Protein Total 6.7 (L) 6.8 - 8.8 g/dL    Albumin 2.3 (L) 3.4 - 5.0 g/dL    Bilirubin Total 0.7 0.2 - 1.3 mg/dL    GFR Estimate 88 >60 mL/min/1.73m2   Lactic acid whole blood   Result Value Ref [facility]    Lactic Acid 1.8 0.7 - 2.0 mmol/L   CRP inflammation   Result Value Ref [facility]    CRP Inflammation 310.0 (H) 0.0 - 8.0 mg/L   Procalcitonin   Result Value Ref [facility]    Procalcitonin 1.64 (H) <0.05 ng/mL   CBC with platelets and differential   Result Value Ref [facility]    WBC Count 22.2 (H) 4.0 - 11.0 10e3/uL    RBC Count 4.97 4.40 - 5.90 10e6/uL    Hemoglobin 13.9 13.3 - 17.7 g/dL    Hematocrit 40.9 40.0 - 53.0 %    MCV 82 78 - 100 fL    MCH 28.0 26.5 - 33.0 pg    MCHC 34.0 31.5 - 36.5 g/dL    RDW 12.8 10.0 - 15.0 %    Platelet Count 361 150 - 450 10e3/uL    % Neutrophils 92 %    % Lymphocytes 2 %    % Monocytes 5 %    % Eosinophils 0 %    % Basophils 0 %    % Immature Granulocytes 1 %    NRBCs per 100 WBC 0 <1 /100    Absolute Neutrophils 20.3 (H) 1.6 - 8.3 10e3/uL    Absolute Lymphocytes 0.5 (L) 0.8 - 5.3 10e3/uL    Absolute Monocytes 1.2 0.0 - 1.3 10e3/uL    Absolute Eosinophils 0.0 0.0 - 0.7 10e3/uL    Absolute Basophils 0.0 0.0 - 0.2 10e3/uL    Absolute Immature Granulocytes 0.2 <=0.4 10e3/uL    Absolute NRBCs 0.0 10e3/uL   Symptomatic; Yes; 2/1/2022 Influenza A/B & SARS-CoV2 (COVID-19) Virus PCR Multiplex Nasopharyngeal    Specimen: Nasopharyngeal; Swab   Result Value Ref [facility]    Influenza  A PCR Negative Negative    Influenza B PCR Negative Negative    RSV PCR Negative Negative    SARS CoV2 PCR Negative Negative    Narrative    Testing was performed using the Xpert Xpress CoV2/Flu/RSV Assay on the Cepheid GeneXpert Instrument. This test should be ordered for the detection of SARS-CoV-2 and influenza viruses in individuals who meet clinical and/or epidemiological criteria. Test performance is unknown in asymptomatic patients. This test is for in vitro diagnostic use under the FDA EUA for laboratories certified under CLIA to perform high or moderate complexity testing. This test has not been FDA cleared or approved. A negative result does not rule out the presence of PCR inhibitors in the specimen or target RNA in concentration below the limit of detection for the assay. If only one viral target is positive but coinfection with multiple targets is suspected, the sample should be re-tested with another FDA cleared, approved, or authorized test, if coinfection would change clinical management. This test was validated by the [facility]. These laboratories are certified under the Clinical  Laboratory Improvement Amendments of 1988 (CLIA-88) as qualified to perform high complexity laboratory testing.   Extra Tube    Narrative    The following orders were created for panel order Extra Tube.  Procedure                               Abnormality         Status                     ---------                               -----------         ------                     Extra Blue Top Tube[[id number]]                              Final result               Extra Red Top Tube[[id number]]                               Final result                 Please view results for these tests on the individual orders.   Extra Blue Top Tube   Result Value Ref [facility]    Hold Specimen     Extra Red Top Tube   Result Value Ref [facility]    Hold Specimen     D dimer quantitative   Result Value Ref [facility]    D-Dimer  Quantitative 1.96 (H) 0.00 - 0.50 ug/mL FEU    Narrative    This D-dimer assay is intended for use in conjunction with a clinical pretest probability assessment model to exclude pulmonary embolism (PE) and deep venous thrombosis (DVT) in outpatients suspected of PE or DVT. The cut-off value is 0.50 ug/mL FEU.   UA with Microscopic reflex to Culture    Specimen: Urine, Midstream   Result Value Ref [facility]    Color Urine Light Yellow Colorless, Straw, Light Yellow, Yellow    Appearance Urine Clear Clear    Glucose Urine >1000 (A) Negative mg/dL    Bilirubin Urine Negative Negative    Ketones Urine 60  (A) Negative mg/dL    Specific Gravity Urine 1.041 (H) 1.003 - 1.035    Blood Urine Trace (A) Negative    pH Urine 5.5 4.7 - 8.0    Protein Albumin Urine 10  (A) Negative mg/dL    Urobilinogen Urine Normal Normal, 2.0 mg/dL    Nitrite Urine Negative Negative    Leukocyte Esterase Urine Negative Negative    Mucus Urine Present (A) None Seen /LPF    RBC Urine 2 <=2 /HPF    WBC Urine 1 <=5 /HPF    Squamous Epithelials Urine <1 <=1 /HPF    Narrative    Urine Culture not indicated   XR Chest Port 1 View    Narrative    PROCEDURE:  XR CHEST PORT 1 VIEW    HISTORY: fever and cough. .    COMPARISON:  8/19/2019    FINDINGS:    The cardiomediastinal contours are stable.  Bilateral patchy airspace consolidation is new. No effusion or  pneumothorax.      Impression    IMPRESSION:  Multifocal pneumonia. Recommend follow-up.      NADIA BARON MD         SYSTEM ID:  [id number]       Medications   cefTRIAXone IN D5W (ROCEPHIN) intermittent infusion 2 g (0 g Intravenous Stopped 2/1/22 1919)   azithromycin (ZITHROMAX) 500 mg in sodium chloride 0.9 % 250 mL intermittent infusion (500 mg Intravenous New Bag 2/1/22 1914)       Assessments & Plan (with Medical Decision Making)   Findings as above.  Influenza and Covid negative.  Rather significant leukocytosis.  Bilateral infiltrates.  Productive cough.  Plan will be for admission to the  hospital with IV antibiotics, blood cultures, and further care.  He otherwise looks okay.  Found watching TV, pleasant, talkative on multiple rechecks.  Patient was graciously accepted by Dr. Arriaga.     This document was prepared using a combination of typing and voice generated software.  While every attempt was made for accuracy, spelling and grammatical errors may exist.        I have reviewed the nursing notes.    I have reviewed the findings, diagnosis, plan and need for follow up with the patient.          New Prescriptions    No medications on file       Final diagnoses:   Multifocal pneumonia   Acute respiratory failure with hypoxia (H)       2/1/2022   [facility]     Meet Weiner PA-C  02/01/22 2027

## 2022-02-02 NOTE — PHARMACY
Pharmacy Antimicrobial Stewardship Assessment     Current Antimicrobial Therapy:  Anti-infectives (From now, onward)    Start     Dose/Rate Route Frequency Ordered Stop    22  azithromycin (ZITHROMAX) 500 mg in sodium chloride 0.9 % 250 mL intermittent infusion         500 mg  over 1 Hours Intravenous EVERY 24 HOURS 22  cefTRIAXone IN D5W (ROCEPHIN) intermittent infusion 2 g         2 g  100 mL/hr over 30 Minutes Intravenous EVERY 24 HOURS 22            Indication: CAP    Days of Therapy: 2     Pertinent Labs:  Lab Test 22  0512 22  1801   WBC 17.9* 22.2*     Lab Test 22  0512 22  1801   LACT 1.1 1.8   CRP  --  310.0*   SED  --   --    PCAL 3.48* 1.64*        Temperature:  Temp (24hrs), Av.1  F (37.8  C), Min:99  F (37.2  C), Max:102.2  F (39  C)    Culture Results:   22: Legionella pneumophila = in process  22: Respiratory gram stain = gram positive cocci, gram positive bacilli  22: Blood culture x 2 = no growth  22: Influenza, RSV, COVID = negative    Recommendations/Interventions:  No recommendations at this time. Will continue to monitor.     Kathleen Spears RPH  2022

## 2022-02-02 NOTE — ED NOTES
Pt reports that he is comfortable on the bed just the way he is and does not want to reposition at this time.

## 2022-02-02 NOTE — PLAN OF CARE
[facility] Inpatient Admission Note:    Patient admitted to 3106/3106-1 at approximately 2050 via wheelchair accompanied by transport tech from emergency room . Report received from Drew in SBAR format at 2040 via telephone. Patient ambulated to bed via self.. Patient is alert and oriented X 3, denies pain; rates at 0 on 0-10 scale.  Patient oriented to room, unit, hourly rounding, and plan of care. Explained admission packet and patient handbook with patient bill of rights brochure. Will continue to monitor and document as needed.     Inpatient Nursing criteria listed below was met:    Health care directives status obtained and documented: Yes    Patient identifies a surrogate decision maker: No If yes, who:N/A Contact Information:N/A     If initial lactic acid greater than 2.0, repeat lactic acid drawn within one hour of arrival to unit: NA. If no, state reason: N/A    Clergy visit ordered if patient requests: No    Skin issues/needs documented: No    Isolation Patient: no Education given, correct sign in place and documentation row added to PCS:  N/A    Fall Prevention Yes: Care plan updated, education given and documented, sticker and magnet in place: Yes    Care Plan initiated: Yes    Education Documented (including assessment): Yes    Patient has discharge needs : No If yes, please explain:N/A

## 2022-02-02 NOTE — ED NOTES
Face to face report given with opportunity to observe patient.    Report given to ELLE Russell.     Dali Tomlinson RN   2/1/2022  7:01 PM

## 2022-02-02 NOTE — PROGRESS NOTES
02/02/22 1200   General Information   Onset of Illness/Injury or Date of Surgery 02/01/22   Referring Physician Dr. Arriaga    Pertinent History of Current Problem Patient is an 86 year old male who was admitted on 2/1/22 for worsening cough. Suspected pneumonia and clinical bedside swallowing orders were ordered to rule out aspiration. Patient does not report history of difficulty eating/drinking.     General Observations Patients was cooperative throughout evaluation. His wife was present at bedside.    Past History of Dysphagia Patient reports no past history. He states that he does not avoid any foods because they are too difficult to swallow.    Disability/Function   Hearing Difficulty or Deaf no   Wear Glasses or Blind yes   Vision Management reading glasses   Concentrating, Remembering or Making Decisions Difficulty no   Difficulty Communicating no   Difficulty Eating/Swallowing yes   Type of Evaluation   Type of Evaluation Swallow Evaluation   Oral Motor   Oral Musculature generally intact   Structural Abnormalities none present   Mucosal Quality good   Dentition (Oral Motor)   Dentition (Oral Motor) dental appliance/dentures   Dental Appliance/Denture (Oral Motor) upper and lower   Facial Symmetry (Oral Motor)   Facial Symmetry (Oral Motor) WNL   Lip Function (Oral Motor)   Lip Range of Motion (Oral Motor) WNL   Lip Strength (Oral Motor) WNL   Tongue Function (Oral Motor)   Tongue ROM (Oral Motor) WNL   Tongue Strength (Oral Motor) WNL   Tongue Coordination/Speed (Oral Motor) WNL   Jaw Function (Oral Motor)   Jaw Function (Oral Motor) WNL   Cough/Swallow/Gag Reflex (Oral Motor)   Volitional Throat Clear/Cough (Oral Motor) WNL   Volitional Swallow (Oral Motor) WNL   Vocal Quality/Secretion Management (Oral Motor)   Vocal Quality (Oral Motor) WNL   Secretion Management (Oral Motor) WNL   General Swallowing Observations   Current Diet/Method of Nutritional Intake (General Swallowing Observations, NIS) NPO    Respiratory Support (General Swallowing Observations) nasal cannula   Swallowing Evaluation Clinical swallow evaluation   Clinical Swallow Evaluation   Feeding Assistance no assistance needed   Additional evaluation(s) completed today No   Rationale for completing additional evaluation A clinical bedside swallow evaluation cannot rule out silent aspiration. If primary care provider or other suspects that patient is silently aspirating a video swallow study may be warranted.    Clinical Swallow Evaluation Textures Trialed thin liquids;soft & bite-sized;solid foods   Clinical Swallow Eval: Thin Liquid Texture Trial   Mode of Presentation, Thin Liquids cup;straw;self-fed   Volume of Liquid or Food Presented 4 oz.    Oral Phase of Swallow WFL   Pharyngeal Phase of Swallow intact   Diagnostic Statement Patient tolerated 4 oz. of thin liquids via cup and straw without demonstrating any overt signs/symptoms of aspiration.    Clinical Swallow Eval: Soft & Bite Sized   Mode of Presentation spoon;self-fed   Volume Presented 2 oz.    Oral Phase WFL   Pharyngeal Phase intact   Diagnostic Statement Patient tolerated 2 oz. of soft & bite sized texture without any overt signs/symptoms of aspiration. Patient demonstrated adequate mastication and clearance of bolus from oral cavity during trials.    Clinical Swallow Evaluation: Solid Food Texture Trial   Mode of Presentation self-fed   Volume Presented 1 oz.    Oral Phase WFL   Pharyngeal Phase intact   Diagnostic Statement Patient tolerated 1 oz. of regular texture without any overt signs/symptoms of aspiration. Patient demonstrated adequate mastication and clearance of bolus from oral cavity during trials.    Esophageal Phase of Swallow   Patient reports or presents with symptoms of esophageal dysphagia No   Swallowing Recommendations   Diet Consistency Recommendations thin liquids (level 0);regular diet   Supervision Level for Intake patient independent   Mode of Delivery  Recommendations bolus size, small   Monitoring/Assistance Required (Eating/Swallowing) monitor for cough or change in vocal quality with intake   Recommended Feeding/Eating Techniques (Swallow Eval) patient is independent, no specific recommendations;maintain upright sitting position for eating;maintain upright posture during/after eating for 30 minutes   Instrumental Assessment Recommendations instrumental evaluation not recommended at this time   Comment, Swallowing Recommendations Patient is tolerating a regular diet with thin liquids independently.    SLP Therapy Assessment/Plan   Criteria for Skilled Therapeutic Interventions Met (SLP Eval) does not meet criteria for skilled intervention   Therapy Frequency (SLP Eval) evaluation only   Predicted Duration of Therapy Intervention (SLP Eval) Evaluation only    Comment, Therapy Assessment/Plan (SLP) Patient was seen this AM for clinical bedside swallow evaluation to rule out aspiration. Patient reports no current or prior history of difficulty swallowing solids or liquids. His oral musculature is adequate for swallow functions and he currently wears upper and lower dentures. Patient trialed thin liquids via cup and straw without demonstrating any overt signs/symptoms of aspiration. He  was observed to take single sips from both cup and straw. Patient trialed soft & bite sized and regular texture PO trials and he demonstrated adequate mastication and clearance of bolus from oral cavity. He did not demonstrate any overt signs/symptoms of aspiration during solid texture trials. Patient was observed to take small single bites during trials. Patient is independently tolerating  a regular diet with thin liquids and does not require diet modifications at this time. No overt signs/symptoms of aspiration were witnessed during evaluation. Will complete orders at this time. If patient beings to have increased difficulty with swallowing or he beings to demonstrate signs of  aspiration, new orders should be place for SLP to re-assess.    Therapy Plan Review/Discharge Plan (SLP)   Therapy Plan Review (SLP) evaluation/treatment results reviewed;care plan/treatment goals reviewed;risks/benefits reviewed;participants voiced agreement with care plan;current/potential barriers reviewed;participants included;patient;spouse/significant other   SLP Discharge Planning    SLP Discharge Recommendation (DC Rec) home   SLP Rationale for DC Rec Patient is independently tolerating a regular diet.    SLP Brief overview of current status  Patient is independently tolerating  a regular diet with thin liquids and does not require diet modifications at this time. No overt signs/symptoms of aspiration were witnessed during evaluation. Will complete orders at this time. If patient beings to have increased difficulty with swallowing or he beings to demonstrate signs of aspiration, new orders should be place for SLP to re-assess.    Therapy Certification   Start of Care Date 02/02/22   Certification date from 02/02/22   Certification date to 02/02/22   Medical Diagnosis  Multifocal pneumonia    Total Evaluation Time   Total Evaluation Time (Minutes) 15

## 2022-02-02 NOTE — PROGRESS NOTES
"CLINICAL NUTRITION SERVICES  -  ASSESSMENT NOTE    Urban Huizar : MST with a score of 3. 14-23lb weight loss, reduced appetite/intake    86 yom admitted for pneumonia. Pt has a hx of type 2 diabetes, HTN. Limited weight hx available, noted about 25lb weight loss since 2019. Unable to determine exact timeframe of weight loss. Poor appetite before admission. Diet advanced this morning to a regular diet, pt ordere a couple meals today, only 1 meal documented.    Diet Order: Regular  Intake: 1 meal with 100% intake      Height: 6' 1\"  Weight: 216 lbs 11.39 oz  Body mass index is 28.59 kg/m .  Weight Status:  Overweight BMI 25-29.9  Weight History:   Wt Readings from Last 10 Encounters:   02/01/22 98.3 kg (216 lb 11.4 oz)   08/19/19 110 kg (242 lb 8.1 oz)   02/26/18 115.4 kg (254 lb 6.4 oz)     Malnutrition Diagnosis: Unable to determine due to limited weights avaialble    NUTRITION RECOMMENDATIONS  - Encourage intake during meal times    MONITORING AND EVALUATION:  RD will monitor intake, weight, labs                      "

## 2022-02-02 NOTE — PROGRESS NOTES
Assessment completed with Urban.    LOC: alert     Dx: PNA  Chronic Disease Management: DM 2, HTN, hyponatremia    Lives with: spouse and small dog  Living at:  Home in [place]  Transportation: YES, both drive    Primary PCP: [facility]  Insurance:  Medicare and BCBS  Medicare: INPT letter reviewed with Urban.    Support System:  family  Homecare/PCA: no  /County Services:   no  [place]: YES      How was the [facility] notification completed: admitting notified    Health Care Directive: states has one at home  Guardian: no  POA: no    Pharmacy: [facility]  Meds management: manages own    Adequate Resources for needs (housing, utilities, food/med): YES  Household chores: shared  Work/community/social activity: YES, as desired    ADLs: independent  Ambulation:independent, sometimes uses a walking stick  Falls: couple weeks ago fell in the snow, could not get up, was there for about an hour, spouse called EMS.  Nutrition: no concern  Sleep: sleeps well    Equipment used: has grab bars in bathroom      Oxygen supplier: no      Does the supplier have valid oxygen orders: n/a    Mental health: denies  Substance abuse: denies  Exposure to violence/abuse: denies  Stressors: not asked    Able to Return to Prior Living Arrangements: YES    Choice of Vendor: n/a    Barriers: unknown    JASPREET: low    Plan: home with spouse transporting.    Ellie Dhillon CM

## 2022-02-03 ENCOUNTER — APPOINTMENT (OUTPATIENT)
Dept: PHYSICAL THERAPY | Facility: HOSPITAL | Age: 87
DRG: 193 | End: 2022-02-03
Attending: INTERNAL MEDICINE
Payer: COMMERCIAL

## 2022-02-03 LAB
ANION GAP SERPL CALCULATED.3IONS-SCNC: 6 MMOL/L (ref 3–14)
BUN SERPL-MCNC: 18 MG/DL (ref 7–30)
CALCIUM SERPL-MCNC: 8.3 MG/DL (ref 8.5–10.1)
CHLORIDE BLD-SCNC: 101 MMOL/L (ref 94–109)
CO2 SERPL-SCNC: 28 MMOL/L (ref 20–32)
CREAT SERPL-MCNC: 0.7 MG/DL (ref 0.66–1.25)
ERYTHROCYTE [DISTWIDTH] IN BLOOD BY AUTOMATED COUNT: 12.7 % (ref 10–15)
GFR SERPL CREATININE-BSD FRML MDRD: >90 ML/MIN/1.73M2
GLUCOSE BLD-MCNC: 145 MG/DL (ref 70–99)
GLUCOSE BLDC GLUCOMTR-MCNC: 159 MG/DL (ref 70–99)
HCT VFR BLD AUTO: 37.5 % (ref 40–53)
HGB BLD-MCNC: 12.4 G/DL (ref 13.3–17.7)
LACTATE SERPL-SCNC: 1.4 MMOL/L (ref 0.7–2)
MCH RBC QN AUTO: 27.6 PG (ref 26.5–33)
MCHC RBC AUTO-ENTMCNC: 33.1 G/DL (ref 31.5–36.5)
MCV RBC AUTO: 83 FL (ref 78–100)
PLATELET # BLD AUTO: 378 10E3/UL (ref 150–450)
POTASSIUM BLD-SCNC: 3.6 MMOL/L (ref 3.4–5.3)
PROCALCITONIN SERPL-MCNC: 2.35 NG/ML
RBC # BLD AUTO: 4.5 10E6/UL (ref 4.4–5.9)
SODIUM SERPL-SCNC: 135 MMOL/L (ref 133–144)
WBC # BLD AUTO: 12.2 10E3/UL (ref 4–11)

## 2022-02-03 PROCEDURE — 97116 GAIT TRAINING THERAPY: CPT | Mod: GP | Performed by: PHYSICAL THERAPIST

## 2022-02-03 PROCEDURE — 250N000011 HC RX IP 250 OP 636: Performed by: INTERNAL MEDICINE

## 2022-02-03 PROCEDURE — 250N000013 HC RX MED GY IP 250 OP 250 PS 637: Performed by: INTERNAL MEDICINE

## 2022-02-03 PROCEDURE — 99232 SBSQ HOSP IP/OBS MODERATE 35: CPT | Performed by: INTERNAL MEDICINE

## 2022-02-03 PROCEDURE — 84145 PROCALCITONIN (PCT): CPT | Performed by: INTERNAL MEDICINE

## 2022-02-03 PROCEDURE — 83605 ASSAY OF LACTIC ACID: CPT | Performed by: INTERNAL MEDICINE

## 2022-02-03 PROCEDURE — 85027 COMPLETE CBC AUTOMATED: CPT | Performed by: INTERNAL MEDICINE

## 2022-02-03 PROCEDURE — 80048 BASIC METABOLIC PNL TOTAL CA: CPT | Performed by: INTERNAL MEDICINE

## 2022-02-03 PROCEDURE — 258N000003 HC RX IP 258 OP 636: Performed by: INTERNAL MEDICINE

## 2022-02-03 PROCEDURE — 999N000157 HC STATISTIC RCP TIME EA 10 MIN

## 2022-02-03 PROCEDURE — 36415 COLL VENOUS BLD VENIPUNCTURE: CPT | Performed by: INTERNAL MEDICINE

## 2022-02-03 PROCEDURE — 97161 PT EVAL LOW COMPLEX 20 MIN: CPT | Mod: GP | Performed by: PHYSICAL THERAPIST

## 2022-02-03 PROCEDURE — 97530 THERAPEUTIC ACTIVITIES: CPT | Mod: GP | Performed by: PHYSICAL THERAPIST

## 2022-02-03 PROCEDURE — 120N000001 HC R&B MED SURG/OB

## 2022-02-03 RX ADMIN — ENOXAPARIN SODIUM 40 MG: 40 INJECTION SUBCUTANEOUS at 21:21

## 2022-02-03 RX ADMIN — SODIUM CHLORIDE: 9 INJECTION, SOLUTION INTRAVENOUS at 03:28

## 2022-02-03 RX ADMIN — SODIUM CHLORIDE: 9 INJECTION, SOLUTION INTRAVENOUS at 22:04

## 2022-02-03 RX ADMIN — CEFTRIAXONE SODIUM 2 G: 2 INJECTION, SOLUTION INTRAVENOUS at 21:18

## 2022-02-03 RX ADMIN — GUAIFENESIN AND CODEINE PHOSPHATE 10 ML: 10; 100 LIQUID ORAL at 21:21

## 2022-02-03 RX ADMIN — TIMOLOL MALEATE 1 DROP: 5 SOLUTION OPHTHALMIC at 07:52

## 2022-02-03 RX ADMIN — ACETAMINOPHEN 975 MG: 325 TABLET, FILM COATED ORAL at 17:28

## 2022-02-03 RX ADMIN — ALLOPURINOL 200 MG: 100 TABLET ORAL at 07:52

## 2022-02-03 RX ADMIN — GLIPIZIDE 10 MG: 5 TABLET ORAL at 12:10

## 2022-02-03 RX ADMIN — AZITHROMYCIN MONOHYDRATE 500 MG: 500 INJECTION, POWDER, LYOPHILIZED, FOR SOLUTION INTRAVENOUS at 22:00

## 2022-02-03 RX ADMIN — LATANOPROST 1 DROP: 50 SOLUTION OPHTHALMIC at 21:21

## 2022-02-03 RX ADMIN — SODIUM CHLORIDE: 9 INJECTION, SOLUTION INTRAVENOUS at 13:18

## 2022-02-03 RX ADMIN — GLIPIZIDE 10 MG: 5 TABLET ORAL at 06:30

## 2022-02-03 ASSESSMENT — ACTIVITIES OF DAILY LIVING (ADL)
ADLS_ACUITY_SCORE: 11
ADLS_ACUITY_SCORE: 13
ADLS_ACUITY_SCORE: 11
ADLS_ACUITY_SCORE: 13
ADLS_ACUITY_SCORE: 11
ADLS_ACUITY_SCORE: 11
ADLS_ACUITY_SCORE: 13
ADLS_ACUITY_SCORE: 13
ADLS_ACUITY_SCORE: 11
ADLS_ACUITY_SCORE: 14
ADLS_ACUITY_SCORE: 11
ADLS_ACUITY_SCORE: 13
ADLS_ACUITY_SCORE: 14
ADLS_ACUITY_SCORE: 11
ADLS_ACUITY_SCORE: 11
ADLS_ACUITY_SCORE: 13
ADLS_ACUITY_SCORE: 13
ADLS_ACUITY_SCORE: 11
ADLS_ACUITY_SCORE: 13

## 2022-02-03 ASSESSMENT — MIFFLIN-ST. JEOR: SCORE: 1762.88

## 2022-02-03 NOTE — PLAN OF CARE
Free from falls/injuries this shift.Freq , loose, NPC. Cough better after robitussin. IVF @ 100/hr. Continues on IV zithromax and rocephin. Temp max this shift: 100.5. Med with 975 mg tylenol. O2 placed at 1L NC as sats drop to mid-upper 80's at times.  Bed alarm on at all times. Uses call light approp. Appetite good for dinner.     Face to face report given with opportunity to observe patient.    Report given to Florina Garcia RN   2/2/2022  11:15 PM

## 2022-02-03 NOTE — PLAN OF CARE
"/54 (BP Location: Left arm)   Pulse 79   Temp 98.8  F (37.1  C) (Tympanic)   Resp 18   Ht 1.854 m (6' 1\")   Wt 98.3 kg (216 lb 11.4 oz)   SpO2 94%   BMI 28.59 kg/m      Pt is A&O x4. Afebrile. Denies pain. Frequent cough, productive at times. ALTAMIRANO. Denies SOB. Crackles to the LLL. Remains on 1L d/t dropping to mid to upper 80's. IVF @ 100cc/hr. Call light in reach and patient makes needs known.   Face to face report given with opportunity to observe patient.    Report given to ELLE Barfield RN   2/3/2022  7:06 AM      "

## 2022-02-03 NOTE — PHARMACY
Pharmacy Antimicrobial Stewardship Assessment     Current Antimicrobial Therapy:  Anti-infectives (From now, onward)    Start     Dose/Rate Route Frequency Ordered Stop    22  azithromycin (ZITHROMAX) 500 mg in sodium chloride 0.9 % 250 mL intermittent infusion         500 mg  over 1 Hours Intravenous EVERY 24 HOURS 22  cefTRIAXone IN D5W (ROCEPHIN) intermittent infusion 2 g         2 g  100 mL/hr over 30 Minutes Intravenous EVERY 24 HOURS 22            Indication: CAP    Days of Therapy: 3     Pertinent Labs:  Recent Labs   Lab Test 22   WBC 12.2* 17.9* 22.2*     Recent Labs   Lab Test 22  18019  1020 17  1045   LACT  --  1.1  --  1.8  --   --    CRP  --   --   --  310.0* 24.3* <2.9   SED  --   --   --   --   --  8   PCAL 2.35* 3.48*   < > 1.64*  --   --     < > = values in this interval not displayed.        Temperature:  Temp (24hrs), Av.7  F (37.1  C), Min:97.6  F (36.4  C), Max:100.5  F (38.1  C)    Culture Results:   22: Legionella pneumophila = in process  22: Respiratory gram stain = gram positive cocci, gram positive bacilli  22: Blood culture x 2 = no growth  22: Influenza, RSV, COVID = negative    Recommendations/Interventions:  No recommendations at this time. Will continue to monitor.     Kathleen Spears RPH  February 3, 2022

## 2022-02-03 NOTE — PROGRESS NOTES
[facility]    Hospitalist Progress Note    Assessment & Plan   Urban Huizar is a 86 year old male who was admitted on 2/1/2022.  Patient is an 86-year-old gentleman who for the last couple of weeks is just not been feeling well he has had progressive cough shortness of breath poor appetite significant weakness.  Also fever last couple of days.  Has gotten the point he has a hard time even getting up moving around.  He was vaccinated for Covid.  [facility]  On presentation to the ER his O2 sats initially were 90% room air.  He was normotensive.  Had a fever 102.2.  He did end up requiring about 1 L just to keep her sats greater than 90%.  No syncopal episodes no peripheral edema.  No bleeding troubles at all just very poor appetite.    1.  Acute respiratory failure with hypoxia:   Patient stable respiratory status and his oxygen requirement is decreasing. We will continue with current antibiotics.    2.  Bilateral pneumonia:   Patient's pneumonia seems to be responding to azithromycin and Rocephin. His oxygen requirement is improving and as well as procalcitonin. Leukocytosis also improving as well.  We will continue with the IV antibiotics for now and reassess tomorrow.    3.  Diabetes mellitus type 2:   Patient's her blood glucoses are reasonably controlled with p.o. glipizide.    4.  Cardiac status:   Seems to be pretty well compensated    5.  He is status post subdural hematoma evacuation 2012.    Did not have a stroke.  He was left with some minor speech impediment apparently and word finding difficulty.  Did not have a specific stroke.    6.  Pharyngeal neck abscess:   End up going to I believe [facility] and had this drained by interventional radiology.  This was back in August 2010.      Diet: Regular Diet Adult  Fluids: Saline 100 cc an hour    DVT Prophylaxis: Lovenox  Code Status: Special Code    Disposition: Expected discharge in 1-2 days once recovers from this  illness afebrile.    Oscar Louis       Interval History   Patient is sitting in chair eating breakfast.  He is feeling pretty good and feels like getting better every day.  He still is coughing productive of sputum.  Denies fever, chills, chest pain, nausea, vomiting, abdominal pain, dysuria,    -Data reviewed today: I reviewed all new labs and imaging results over the last 24 hours. I personally reviewed no images or EKG's today.    Physical Exam   Temp: 99.9  F (37.7  C) Temp src: Tympanic BP: 157/71 Pulse: 88   Resp: 18 SpO2: (!) 91 % O2 Device: None (Room air) Oxygen Delivery: 1 LPM  Vitals:    02/01/22 1728 02/01/22 2101 02/03/22 0522   Weight: 99.8 kg (220 lb) 98.3 kg (216 lb 11.4 oz) 101.9 kg (224 lb 10.4 oz)     Vital Signs with Ranges  Temp:  [97.6  F (36.4  C)-100.5  F (38.1  C)] 99.9  F (37.7  C)  Pulse:  [69-88] 88  Resp:  [18-20] 18  BP: (113-157)/(48-71) 157/71  SpO2:  [87 %-96 %] 91 %  I/O last 3 completed shifts:  In: 3508 [P.O.:920; I.V.:2588]  Out: 2275 [Urine:2275]    Peripheral IV 02/02/22 Right;Dorsal Lower forearm (Active)   Site Assessment WDL 02/03/22 0700   Line Status Infusing 02/03/22 0700   Phlebitis Scale 0-->no symptoms 02/03/22 0700   Infiltration Scale 0 02/03/22 0700   Number of days: 1     No line/device    Constitutional: Alert and oriented x3. No distress  HEENT: Normocephalic/atraumatic, PERRL, EOMI, mouth moist, neck supple, no LN  Cardiovascular: RRR. no Murmur, no  rubs, or gallops.  JVD no.  Bruits no.  Pulses 2+  Respiratory: Very loose spastic cough.  Some scattered crackles and rhonchi bilaterally rare wheeze.  No obvious consolidation.    Abdomen: Soft, nontender, nondistended, no organomegaly. Bowel sounds present  Extremities: Warm/dry.  No edema  Neuro:   Non focal, cranial nerves intact, Moves all extremities.    Medications     sodium chloride 100 mL/hr at 02/03/22 0328       allopurinol  200 mg Oral Daily     azithromycin  500 mg Intravenous Q24H     cefTRIAXone  2 g  Intravenous Q24H     enoxaparin ANTICOAGULANT  40 mg Subcutaneous Q24H     glipiZIDE  10 mg Oral BID AC     guaiFENesin-codeine  10 mL Oral At Bedtime     latanoprost  1 drop Both Eyes At Bedtime     sodium chloride (PF)  3 mL Intracatheter Q8H     timolol maleate  1 drop Both Eyes QAM       Data   Recent Labs   Lab 02/03/22  0518 02/03/22  0209 02/02/22  1645 02/02/22  0850 02/02/22  0512 02/01/22  1801   WBC 12.2*  --   --   --  17.9* 22.2*   HGB 12.4*  --   --   --  12.4* 13.9   MCV 83  --   --   --  83 82     --   --   --  325 361     --   --   --  130* 127*   POTASSIUM 3.6  --   --   --  4.0 4.2   CHLORIDE 101  --   --   --  96 92*   CO2 28  --   --   --  29 26   BUN 18  --   --   --  20 22   CR 0.70  --   --   --  0.74 0.79   ANIONGAP 6  --   --   --  5 9   SANJAY 8.3*  --   --   --  8.3* 8.7   * 159* 189*   < > 154* 243*   ALBUMIN  --   --   --   --  1.9* 2.3*   PROTTOTAL  --   --   --   --  5.8* 6.7*   BILITOTAL  --   --   --   --  0.6 0.7   ALKPHOS  --   --   --   --  64 77   ALT  --   --   --   --  32 41   AST  --   --   --   --  19 25    < > = values in this interval not displayed.       No results found for this or any previous visit (from the past 24 hour(s)).

## 2022-02-03 NOTE — PROGRESS NOTES
Inpatient Physical Therapy Evaluation    Name: Urban Huizar MRN# [id number]   Age: 86 year old YOB: 1935     Date of Consultation: February 3, 2022  Consultation is requested by:  Dr. Chapman  Specific Consultation Request:  [email or web address]  Primary care provider: [facility]    General Information:   Onset Date: 2022    History of Current Problem/Admission: Acute respiratory failure with hypoxia (H) [J96.01]  Multifocal pneumonia [J18.9]    Prior Level of Function: Pt reports that he was independent with all ADLs at home prior to getting sick.  Pt ambulates without assistive device at baseline except an occasional walking stick when out on uneven terrain.  Pt drives.  Ambulation: 0 - Independent   Transferrin - Independent   Toiletin - Independent    Bathin - Independent   Dressin - Independent   Eatin - Independent   Communication: 0 - Understands/communicates without difficulty  Swallowin - swallows foods/liquids without difficulty  Cognition: 0 - no cognitive issues reported    Fall history within the last 6 months: Yes, 1    Current Living Situation: Patient lives with his wife.  Pt enters his home through a walk in basement.  Kitchen is on lower level, his bedroom is on upper level.  There are bathrooms located on both levels.      Current Equipment Used at Home: none, has a FWW from prior knee surgery, and walking stick     Patient & Family Goals: to go home     Past Medical History:   No past medical history on file.    Past Surgical History:  Past Surgical History:   Procedure Laterality Date     INCISION AND DRAINAGE ORAL, COMBINED N/A 2019    Procedure: INCISION AND DRAINAGE CENTRAL TONGUE ABSCESS Direct Laryngoscopy;  Surgeon: Madie Lew MD;  Location: [facility]     PHACOEMULSIFICATION WITH STANDARD INTRAOCULAR LENS IMPLANT Left 2018    Procedure: PHACOEMULSIFICATION WITH STANDARD INTRAOCULAR LENS IMPLANT;  PHACOEMULSIFICATION  CATARACT EXTRACTION POSTERIOR CHAMBER LENS LEFT;  Surgeon: Baljinder Shannon MD;  Location: [facility]       Medications:   Current Facility-Administered Medications   Medication     acetaminophen (TYLENOL) tablet 975 mg     allopurinol (ZYLOPRIM) tablet 200 mg     azithromycin (ZITHROMAX) 500 mg in sodium chloride 0.9 % 250 mL intermittent infusion     cefTRIAXone IN D5W (ROCEPHIN) intermittent infusion 2 g     glucose gel 15-30 g    Or     dextrose 50 % injection 25-50 mL    Or     glucagon injection 1 mg     enoxaparin ANTICOAGULANT (LOVENOX) injection 40 mg     glipiZIDE (GLUCOTROL) tablet 10 mg     guaiFENesin-codeine (ROBITUSSIN AC) 100-10 MG/5ML solution 10 mL     ipratropium - albuterol 0.5 mg/2.5 mg/3 mL (DUONEB) neb solution 3 mL     latanoprost (XALATAN) 0.005 % ophthalmic solution 1 drop     lidocaine (LMX4) cream     lidocaine 1 % 0.1-1 mL     melatonin tablet 1 mg     ondansetron (ZOFRAN-ODT) ODT tab 4 mg    Or     ondansetron (ZOFRAN) injection 4 mg     sodium chloride (PF) 0.9% PF flush 3 mL     sodium chloride (PF) 0.9% PF flush 3 mL     sodium chloride 0.9% infusion     timolol maleate (TIMOPTIC) 0.5 % ophthalmic solution 1 drop       Weight Bearing Status: FWB LEs     Cognitive Status Examination:  Orientation: oriented to time, place and person  Level of Consciousness: alert  Follows Commands and Answers Questions: 100% of the time  Personal Safety and Judgement: Intact  Memory: Short-term memory intact and Long-term memory intact  Comments:     Pain:   Currently in pain? No  Pain Location?   Pain Rating:     Physical Therapy Evaluation:   Integumentary/Edema: unremarkable  ROM: LE AROM WFL  Strength: LE strength grossly 4/5 throughout  Bed Mobility: NT, up in chair upon PT arrival.  Transfers: sit<>stand CGA  Gait: Trialed ambulation without assistive device in room with min A and instability noted.  Balance: fair- without support  Sensory: denies numbness/tingling  Coordination: NT    Physical  Therapy Interventions: Balance, Bed Mobility, Gait Training , Neuro-muscular re-education, Strengthening, Transfer Training, Risk Factor Education, Home Programming Guidelines and Progressive Activity/Exercise     Clinical Impressions:  Criteria for Skilled Therapeutic Intervention Met: Yes, treatment indicated  PT Diagnosis: gait disturbance  Influenced by the following impairments: weakness, decreased balance, decreased activity tolerance  Functional limitations due to impairment: decreased tolerance and safety with functional mobility and ADLs  Clinical presentation: Stable/Uncomplicated  Clinical presentation rationale: clinical judgement  Clinical Decision making (complexity): Low Complexity  Frequency: 6 times/week  Predicted Duration of Therapy Intervention (days/wks): 5 days  Anticipated Discharge Disposition: Home with Home Therapy and Home with Outpatient Therapy   Anticipated Equipment Needs at Discharge:   Risks and Benefits of therapy have been explained: Yes  Patient, Family & other staff in agreement with plan of care: Yes  Clinical Impression Comments: PT evaluation completed.  Pt admitted with pneumonia and reports he really hasn't been doing much the past couple of weeks because he has been feeling so sick, prior to getting ill was completely independent.  Pt currently demonstrates instability without use of assistive device for ambulation, weakness, and general deconditioning.  Will see how pt progresses over next 1-2 days, may require home PT vs outpatient PT depending on progress.  Will see during acute care stay to progress activity.     Total Eval Time: 10

## 2022-02-03 NOTE — PLAN OF CARE
"/70 (BP Location: Left arm, Patient Position: Chair)   Pulse 75   Temp 98.1  F (36.7  C) (Tympanic)   Resp 18   Ht 1.854 m (6' 1\")   Wt 101.9 kg (224 lb 10.4 oz)   SpO2 92%   BMI 29.64 kg/m       Pt is A&O, makes needs known. VSS, temp max of 99.9 this shift. Weaned off of O2 this shift, O2 sats low 90's on RA. LS coarse throughout. Patient continues to have a frequent productive cough with sputum production. HRR. On tele: SR 80's per ICU report. BS active, good appetite. IV  mL/hr. Patient up in chair for most of the day. Call light within reach.     Face to face report given with opportunity to observe patient.    Report given to ELLE Coe RN   2/3/2022  3:12 PM    "

## 2022-02-03 NOTE — PROGRESS NOTES
02/03/22 0915   Signing Clinician's Name / Credentials   Signing clinician's name / credentials Rayne Meza, DPT   Quick Adds   Rehab Discipline PT   Starafat, Performance   Minutes of Treatment 9   Symptoms Noted During/After Treatment fatigue   Treatment Detail Pt managed 12 steps with bilateral rails with slow and cautious step-through pattern, CGA.  Demonstrates dyspnea with activity with sats 88% on RA, required 1 minute to recover back to 90%.     Therapeutic Activity   Minutes of Treatment 14 minutes   Symptoms Noted During/After Treatment Fatigue   Treatment Detail Pt ambulated 250'x2 with FWW CGA-SBA.  Improved stability noted with use of FWW but does demonstrate fatigue.  Pt's O2 sats with ambulation on RA 88%, recovers back to 90% within 1 minute of resting.  Discussed importance of ambulation additional 2x today with nursing staff to increase overall activiity.  Discussed need to use FWW upon returning home until strength improves and also discussed home PT vs outpatient PT depending on progress.    PT Discharge Planning    PT Discharge Recommendation (DC Rec) home with home care physical therapy;home with outpatient physical therapy   PT Rationale for DC Rec PT evaluation completed.  Pt admitted with pneumonia and reports he really hasn't been doing much the past couple of weeks because he has been feeling so sick, prior to getting ill was completely independent.  Pt currently demonstrates instability without use of assistive device for ambulation, weakness, and general deconditioning.  Will see how pt progresses over next 1-2 days, may require home PT vs outpatient PT depending on progress.  Will see during acute care stay to progress activity.    PT Brief overview of current status  Pt ambulated 250'x2 with FWW CGA-SBA.  Improved stability noted with use of FWW but does demonstrate fatigue.  Pt's O2 sats with ambulation on RA 88%, recovers back to 90% within 1 minute of resting.  Discussed  importance of ambulation additional 2x today with nursing staff to increase overall activiity.  Discussed need to use FWW upon returning home until strength improves and also discussed home PT vs outpatient PT depending on progress.  Pt managed 12 steps with bilateral rails with slow and cautious step-through pattern, CGA.  Demonstrates dyspnea with activity with sats 88% on RA, required 1 minute to recover back to 90%.     Additional Documentation   Rehab Comments weakness and deconditioning noted   PT Plan Progress mobility and strength   Total Session Time   Total Session Time (minutes) 33 minutes  (10 eval, 23 treatment)

## 2022-02-04 VITALS
RESPIRATION RATE: 20 BRPM | SYSTOLIC BLOOD PRESSURE: 160 MMHG | HEIGHT: 73 IN | WEIGHT: 229.94 LBS | OXYGEN SATURATION: 92 % | TEMPERATURE: 98.6 F | BODY MASS INDEX: 30.47 KG/M2 | DIASTOLIC BLOOD PRESSURE: 58 MMHG | HEART RATE: 77 BPM

## 2022-02-04 LAB
ANION GAP SERPL CALCULATED.3IONS-SCNC: 10 MMOL/L (ref 3–14)
BUN SERPL-MCNC: 13 MG/DL (ref 7–30)
CALCIUM SERPL-MCNC: 8.1 MG/DL (ref 8.5–10.1)
CHLORIDE BLD-SCNC: 102 MMOL/L (ref 94–109)
CO2 SERPL-SCNC: 25 MMOL/L (ref 20–32)
CREAT SERPL-MCNC: 0.6 MG/DL (ref 0.66–1.25)
ERYTHROCYTE [DISTWIDTH] IN BLOOD BY AUTOMATED COUNT: 13 % (ref 10–15)
GFR SERPL CREATININE-BSD FRML MDRD: >90 ML/MIN/1.73M2
GLUCOSE BLD-MCNC: 151 MG/DL (ref 70–99)
GLUCOSE BLDC GLUCOMTR-MCNC: 156 MG/DL (ref 70–99)
HCT VFR BLD AUTO: 34.6 % (ref 40–53)
HGB BLD-MCNC: 11.5 G/DL (ref 13.3–17.7)
M PNEUMO IGG SER IA-ACNC: 0.38 U/L
M PNEUMO IGM SER IA-ACNC: 0.08 U/L
MCH RBC QN AUTO: 27.9 PG (ref 26.5–33)
MCHC RBC AUTO-ENTMCNC: 33.2 G/DL (ref 31.5–36.5)
MCV RBC AUTO: 84 FL (ref 78–100)
PLATELET # BLD AUTO: 383 10E3/UL (ref 150–450)
POTASSIUM BLD-SCNC: 3.5 MMOL/L (ref 3.4–5.3)
PROCALCITONIN SERPL-MCNC: 1.34 NG/ML
RBC # BLD AUTO: 4.12 10E6/UL (ref 4.4–5.9)
SODIUM SERPL-SCNC: 137 MMOL/L (ref 133–144)
WBC # BLD AUTO: 10.9 10E3/UL (ref 4–11)

## 2022-02-04 PROCEDURE — 80048 BASIC METABOLIC PNL TOTAL CA: CPT | Performed by: INTERNAL MEDICINE

## 2022-02-04 PROCEDURE — 85027 COMPLETE CBC AUTOMATED: CPT | Performed by: INTERNAL MEDICINE

## 2022-02-04 PROCEDURE — 99239 HOSP IP/OBS DSCHRG MGMT >30: CPT | Performed by: INTERNAL MEDICINE

## 2022-02-04 PROCEDURE — 84145 PROCALCITONIN (PCT): CPT | Performed by: INTERNAL MEDICINE

## 2022-02-04 PROCEDURE — 258N000003 HC RX IP 258 OP 636: Performed by: INTERNAL MEDICINE

## 2022-02-04 PROCEDURE — 250N000013 HC RX MED GY IP 250 OP 250 PS 637: Performed by: INTERNAL MEDICINE

## 2022-02-04 PROCEDURE — 36415 COLL VENOUS BLD VENIPUNCTURE: CPT | Performed by: INTERNAL MEDICINE

## 2022-02-04 RX ORDER — CODEINE PHOSPHATE AND GUAIFENESIN 10; 100 MG/5ML; MG/5ML
10 SOLUTION ORAL EVERY 6 HOURS PRN
Qty: 118 ML | Refills: 0 | Status: SHIPPED | OUTPATIENT
Start: 2022-02-04

## 2022-02-04 RX ORDER — CEFUROXIME AXETIL 500 MG/1
500 TABLET ORAL 2 TIMES DAILY
Qty: 8 TABLET | Refills: 0 | Status: SHIPPED | OUTPATIENT
Start: 2022-02-04 | End: 2022-02-08

## 2022-02-04 RX ORDER — AZITHROMYCIN 250 MG/1
250 TABLET, FILM COATED ORAL DAILY
Qty: 2 TABLET | Refills: 0 | Status: SHIPPED | OUTPATIENT
Start: 2022-02-04 | End: 2022-02-06

## 2022-02-04 RX ADMIN — GLIPIZIDE 10 MG: 5 TABLET ORAL at 07:43

## 2022-02-04 RX ADMIN — GLIPIZIDE 10 MG: 5 TABLET ORAL at 12:32

## 2022-02-04 RX ADMIN — SODIUM CHLORIDE: 9 INJECTION, SOLUTION INTRAVENOUS at 08:51

## 2022-02-04 RX ADMIN — ALLOPURINOL 200 MG: 100 TABLET ORAL at 08:50

## 2022-02-04 RX ADMIN — TIMOLOL MALEATE 1 DROP: 5 SOLUTION OPHTHALMIC at 08:50

## 2022-02-04 ASSESSMENT — ACTIVITIES OF DAILY LIVING (ADL)
ADLS_ACUITY_SCORE: 11

## 2022-02-04 ASSESSMENT — MIFFLIN-ST. JEOR: SCORE: 1786.88

## 2022-02-04 NOTE — PROGRESS NOTES
Asked to assess pt for home O2. Pt does not have a qualifying diagnosis for an O2 set up. MD notified. Bambi Escobar RRT

## 2022-02-04 NOTE — PROGRESS NOTES
Physical Therapy Discharge Summary    Reason for therapy discharge:    Discharged to home with home therapy.    Progress towards therapy goal(s). See goals on Care Plan in [facility] electronic health record for goal details.  Goals met    Therapy recommendation(s):    recommended home PT or OP PT

## 2022-02-04 NOTE — PROGRESS NOTES
02/04/22 0900   Appointment Canceled   Appointment Canceled Other (see Cancel Comments row)   Cancel Comments Attempted to see patient at 8:15am, but patient was with nursing. Will [place] back after rounds to work with patient.

## 2022-02-04 NOTE — DISCHARGE INSTRUCTIONS
Follow up ramsey at [facility] on February 14 th @ 8 am,   please arrive 10 min early and bring your paperwork from today with you! Thank you!   If you need to reschedule please call [phone]

## 2022-02-04 NOTE — PLAN OF CARE
Patient A+O x4 - calls appropriately and makes needs known. VS and assessment as charted. Lungs coarse throughout and frequent congested coughing. Maintaining sats 92-97% on RA until asleep and sats decreased to 88% - placed on 1 LPM O2 to maintain sats greater than 92%. De-sats to mid 80s with minimal activity but recovers within approximately 2 minutes. NS infusing at 100 mL/hr. Continues on IV Rocephin and Zithromax. Temp 100.7 - gave tylenol and temp down to 98.5 this evening. Tele reading SR 80s per ICU nurse.     Face to face report given with opportunity to observe patient.    Report given to Florina Resendiz RN   2/3/2022  11:19 PM

## 2022-02-04 NOTE — PLAN OF CARE
Patient discharged at 1:03 PM via wheel chair accompanied by spouse and staff. Prescriptions sent to patients preferred pharmacy. All belongings sent with patient.     Discharge instructions reviewed with patient and patient's wife. Listed belongings gathered and returned to patient.     Patient discharged to home.       Surgical Patient   Surgical Procedures during stay: None  Did patient receive discharge instruction on wound care and recognition of infection symptoms? N/A    MISC  Follow up appointment made:  Yes  Home medications returned to patient: N/A  Patient reports pain was well managed at discharge: Yes

## 2022-02-04 NOTE — DISCHARGE SUMMARY
[facility]  Hospitalist Discharge Summary      Date of Admission:  2/1/2022  Date of Discharge:  2/4/2022  Discharging Provider: Oscar Louis MD  Discharge Service: Hospitalist Service    Discharge Diagnoses   Principal Problem:    Multifocal pneumonia  Active Problems:    Type 2 diabetes mellitus without complication, without long-term current use of insulin (H)    Hyponatremia    Acute respiratory failure with hypoxia (H)    Chronic gout without tophus      Follow-ups Needed After Discharge   Follow-up Appointments     Follow-up and recommended labs and tests       Follow up with primary care provider, [facility], within 7   days for hospital follow- up.  The following labs/tests are recommended:   CBC, BMP.           Unresulted Labs Ordered in the Past 30 Days of this Admission     Date and Time Order Name Status Description    2/2/2022 12:03 AM Respiratory Aerobic Bacterial Culture with Gram Stain Preliminary     2/1/2022  9:30 PM Mycoplasma pneumonia abys IgG and IgM In process     2/1/2022  6:05 PM Blood Culture Peripheral Blood Preliminary     2/1/2022  5:21 PM Blood Culture Peripheral Blood Preliminary       These results will be followed up by primary care provider    Discharge Disposition   Discharged to home  Condition at discharge: Stable    Hospital Course   Urban Huizar is a 86 year old male who was admitted on 2/1/2022.  Patient is an 86-year-old gentleman who for the last couple of weeks is just not been feeling well he has had progressive cough shortness of breath poor appetite significant weakness.  Also fever last couple of days.  Has gotten the point he has a hard time even getting up moving around.  He was vaccinated for Covid.  [facility]  On presentation to the ER his O2 sats initially were 90% room air.  He was normotensive.  Had a fever 102.2.  He did end up requiring about 1 L just to keep her sats greater than 90%.  No syncopal  episodes no peripheral edema.  No bleeding troubles at all just very poor appetite.    Acute respiratory failure with hypoxia due to community-acquired multifocal pneumonia:   Patient responded to azithromycin and ceftriaxone IV.  His oxygen requirement improved and on the day of discharge, patient is oxygenating well on room air.  Patient's leukocytosis and procalcitonin all normalized.  Patient was evaluated by physical therapy and Occupational Therapy who thought patient may be discharged home with outpatient therapy.  Patient was discharged on p.o. azithromycin and Ceftin to complete 7-day course.    Consultations This Hospital Stay   SPEECH LANGUAGE PATH ADULT IP CONSULT  SPEECH LANGUAGE PATH ADULT IP CONSULT  RESPIRATORY CARE IP CONSULT  PHYSICAL THERAPY ADULT IP CONSULT    Code Status   Special Code    Time Spent on this Encounter   I, Oscar Louis MD, personally saw the patient today and spent greater than 30 minutes discharging this patient.       Oscar Louis MD  [facility] SURGICAL  [address] [place] [id number]  Phone: [phone]  Fax: [phone]  ______________________________________________________________________    Physical Exam   Vital Signs: Temp: 98.6  F (37  C) Temp src: Tympanic BP: 160/58 Pulse: 77   Resp: 20 SpO2: 92 % O2 Device: None (Room air) Oxygen Delivery: 1 LPM  Weight: 229 lbs 15.04 oz       Primary Care Physician   [facility]    Discharge Orders      Physical Therapy Referral      Occupational Therapy Referral      Reason for your hospital stay    Community-acquired pneumonia     Follow-up and recommended labs and tests     Follow up with primary care provider, [facility], within 7 days for hospital follow- up.  The following labs/tests are recommended: CBC, BMP.     Activity    Your activity upon discharge: ambulate with assist     Diet    Follow this diet upon discharge: Orders Placed This Encounter      Regular Diet Adult        Significant Results and Procedures   Most Recent 3 CBC's:Recent Labs   Lab Test 02/04/22  0511 02/03/22  0518 02/02/22  0512   WBC 10.9 12.2* 17.9*   HGB 11.5* 12.4* 12.4*   MCV 84 83 83    378 325     Most Recent 3 BMP's:Recent Labs   Lab Test 02/04/22  0511 02/04/22  0107 02/03/22  0518 02/02/22  0850 02/02/22  0512     --  135  --  130*   POTASSIUM 3.5  --  3.6  --  4.0   CHLORIDE 102  --  101  --  96   CO2 25  --  28  --  29   BUN 13  --  18  --  20   CR 0.60*  --  0.70  --  0.74   ANIONGAP 10  --  6  --  5   SANJAY 8.1*  --  8.3*  --  8.3*   * 156* 145*   < > 154*    < > = values in this interval not displayed.   ,   Results for orders placed or performed during the hospital encounter of 02/01/22   XR Chest Port 1 View    Narrative    PROCEDURE:  XR CHEST PORT 1 VIEW    HISTORY: fever and cough. .    COMPARISON:  8/19/2019    FINDINGS:    The cardiomediastinal contours are stable.  Bilateral patchy airspace consolidation is new. No effusion or  pneumothorax.      Impression    IMPRESSION:  Multifocal pneumonia. Recommend follow-up.      NADIA BARON MD         SYSTEM ID:  [id number]   XR Chest 2 Views    Narrative    PROCEDURE:  XR CHEST 2 VW    HISTORY: bilateral pneumonia., .    COMPARISON:  122    FINDINGS:  The cardiomediastinal contours are stable. The trachea is midline.  Multifocal airspace consolidation persists. No effusion or  pneumothorax.    No suspicious osseous lesion or subdiaphragmatic free air.      Impression    IMPRESSION:      Persistent multifocal pneumonia.      NADIA BARON MD         SYSTEM ID:  [id number]       Discharge Medications   Current Discharge Medication List      START taking these medications    Details   azithromycin (ZITHROMAX) 250 MG tablet Take 1 tablet (250 mg) by mouth daily for 2 days  Qty: 2 tablet, Refills: 0    Associated Diagnoses: Multifocal pneumonia; Acute respiratory failure with hypoxia (H)      cefuroxime (CEFTIN) 500 MG  tablet Take 1 tablet (500 mg) by mouth 2 times daily for 4 days  Qty: 8 tablet, Refills: 0    Associated Diagnoses: Multifocal pneumonia; Acute respiratory failure with hypoxia (H)      guaiFENesin-codeine (ROBITUSSIN AC) 100-10 MG/5ML solution Take 10 mLs by mouth every 6 hours as needed for cough  Qty: 118 mL, Refills: 0    Associated Diagnoses: Multifocal pneumonia; Acute respiratory failure with hypoxia (H)         CONTINUE these medications which have NOT CHANGED    Details   allopurinol (ZYLOPRIM) 100 MG tablet Take 200 mg by mouth daily       ascorbic acid 1000 MG TABS tablet Take 1,000 mg by mouth daily       blood glucose (ACCU-CHEK GUIDE) test strip 1 strip 2 times daily or as directed.      cinnamon 500 MG TABS Take 1,000 mg by mouth daily      glipiZIDE (GLUCOTROL) 10 MG tablet Take 10 mg by mouth 2 times daily (before meals) . Take before breakfast and noon meal.      Glucosamine Sulfate 1000 MG TABS Take 2,000 mg by mouth daily       latanoprost (XALATAN) 0.005 % ophthalmic solution Place 1 drop into both eyes At Bedtime       metFORMIN (GLUCOPHAGE) 1000 MG tablet Take 1,000 mg by mouth 2 times daily (with meals)       omega-3 acid ethyl esters (LOVAZA) 1 G capsule Take 2 g by mouth daily       pioglitazone (ACTOS) 30 MG tablet Take 30 mg by mouth daily       timolol maleate (TIMOPTIC) 0.5 % ophthalmic solution Place 1 drop into both eyes every morning       Turmeric 500 MG CAPS Take 500 mg by mouth daily      Vitamin D3 (CHOLECALCIFEROL) 25 mcg (1000 units) tablet Take 1 tablet by mouth daily      vitamin E (TOCOPHEROL) 1000 units (450 mg) CAPS capsule Take 1,000 Units by mouth daily         STOP taking these medications       ipratropium - albuterol 0.5 mg/2.5 mg/3 mL (DUONEB) 0.5-2.5 (3) MG/3ML neb solution Comments:   Reason for Stopping:             Allergies   No Known Allergies

## 2022-02-04 NOTE — PLAN OF CARE
"/67 (BP Location: Left arm)   Pulse 83   Temp 99.7  F (37.6  C) (Tympanic)   Resp 24   Ht 1.854 m (6' 1\")   Wt 101.9 kg (224 lb 10.4 oz)   SpO2 (!) 89%   BMI 29.64 kg/m    Pt is A&O x4. Denies pain or shortness of breath at rest. Does appear dyspneic and tachypenic on exertion. Sats on RA after ambulating to the bathroom were 86-87% but at rest, improved within a minute. Pt has been on 1L via NC while sleeping as sats consistently were 88-89%, 1L required to maintain sats >92%. Currently on RA. Up to bathroom with SBA. Makes needs known.   Face to face report given with opportunity to observe patient.    Report given to ELLE Barfield RN   2/4/2022  7:00 AM      "

## 2022-02-04 NOTE — PROGRESS NOTES
Name: Urban Huizar    MRN#: [id number]    Reason for Hospitalization: Acute respiratory failure with hypoxia (H) [J96.01]  Multifocal pneumonia [J18.9]    JASPREET: Low    Discharge Date: February 4, 2022    Patient / Family response to discharge plan: In agreement    Follow-Up Appt: No future appointments.    Other Providers (Care Coordinator, County Services, PCA services etc): No    Discharge Disposition: home via wife to transport    Melodie Mitchell RN

## 2022-02-06 LAB
BACTERIA SPT CULT: ABNORMAL
GRAM STAIN RESULT: ABNORMAL

## 2022-02-07 ENCOUNTER — TELEPHONE (OUTPATIENT)
Dept: CASE MANAGEMENT | Facility: HOSPITAL | Age: 87
End: 2022-02-07
Payer: COMMERCIAL

## 2022-02-07 LAB
BACTERIA BLD CULT: NO GROWTH
BACTERIA BLD CULT: NO GROWTH

## 2022-02-07 NOTE — TELEPHONE ENCOUNTER
Urban Huizar, was discharged to home on 2/4/22 from [facility]. I spoke today with him regarding the patient's discharge.   He indicates he has receive(d) sufficient information upon discharge. Medications were reviewed in full on discharge, including: Medications to be started; medications to be stopped; medications to be continued from preadmission and any side effects. Prescriptions were received at discharge and were able to be filled. Medications are being taken as prescribed.   He indicates they have an appointment scheduled for 2/14/22 and have transportation available. They are able to confirm their appointment time and have it written down.   He did not have any questions regarding discharge instructions or condition.  Per their request, the following employee (s) can be recognized for their outstanding services delivered:  All of the nurses and aids were fantastic  Suggestions to improve service: None  He was informed they may receive a survey in the mail from the hospital. Asked if they would kindly complete the survey in order for [facility] to know if services fully met patient needs.    Melodie Mitchell RN on 2/7/2022 at 1:33 PM

## 2022-05-24 ENCOUNTER — TELEPHONE (OUTPATIENT)
Dept: CARE COORDINATION | Facility: CLINIC | Age: 87
End: 2022-05-24
Payer: COMMERCIAL

## 2022-05-24 ENCOUNTER — HOSPITAL ENCOUNTER (EMERGENCY)
Facility: HOSPITAL | Age: 87
Discharge: HOME | End: 2022-05-24
Attending: NURSE PRACTITIONER | Admitting: NURSE PRACTITIONER
Payer: COMMERCIAL

## 2022-05-24 VITALS
DIASTOLIC BLOOD PRESSURE: 73 MMHG | HEART RATE: 61 BPM | TEMPERATURE: 97.5 F | OXYGEN SATURATION: 95 % | SYSTOLIC BLOOD PRESSURE: 147 MMHG | RESPIRATION RATE: 16 BRPM

## 2022-05-24 DIAGNOSIS — U07.1 INFECTION DUE TO 2019 NOVEL CORONAVIRUS: ICD-10-CM

## 2022-05-24 LAB
FLUAV RNA SPEC QL NAA+PROBE: NEGATIVE
FLUBV RNA RESP QL NAA+PROBE: NEGATIVE
RSV RNA SPEC NAA+PROBE: NEGATIVE
SARS-COV-2 RNA RESP QL NAA+PROBE: POSITIVE

## 2022-05-24 PROCEDURE — G0463 HOSPITAL OUTPT CLINIC VISIT: HCPCS

## 2022-05-24 PROCEDURE — C9803 HOPD COVID-19 SPEC COLLECT: HCPCS

## 2022-05-24 PROCEDURE — 99213 OFFICE O/P EST LOW 20 MIN: CPT | Performed by: NURSE PRACTITIONER

## 2022-05-24 PROCEDURE — 87637 SARSCOV2&INF A&B&RSV AMP PRB: CPT | Performed by: NURSE PRACTITIONER

## 2022-05-24 PROCEDURE — 250N000009 HC RX 250: Performed by: NURSE PRACTITIONER

## 2022-05-24 RX ORDER — DEXAMETHASONE SODIUM PHOSPHATE 10 MG/ML
10 INJECTION INTRAMUSCULAR; INTRAVENOUS ONCE
Status: COMPLETED | OUTPATIENT
Start: 2022-05-24 | End: 2022-05-24

## 2022-05-24 RX ADMIN — DEXAMETHASONE SODIUM PHOSPHATE 10 MG: 10 INJECTION INTRAMUSCULAR; INTRAVENOUS at 15:12

## 2022-05-24 ASSESSMENT — ENCOUNTER SYMPTOMS
RHINORRHEA: 1
NAUSEA: 0
COUGH: 1
LIGHT-HEADEDNESS: 1
DIZZINESS: 0
FEVER: 0
DIARRHEA: 0
VOMITING: 0
APPETITE CHANGE: 0
MYALGIAS: 0
FATIGUE: 1
ACTIVITY CHANGE: 1
EYES NEGATIVE: 1
SORE THROAT: 0
SHORTNESS OF BREATH: 0
HEADACHES: 0
CHILLS: 0

## 2022-05-24 NOTE — ED TRIAGE NOTES
"    Pt c/o feeling tired. States he has had a runny nose for 1 week \"I think it is my allergies\". Wife has covid. Pt did a home covid test on Saturday and was covid positive. Wife brought pt to be evaluated because pt feels tired.  "

## 2022-05-24 NOTE — ED PROVIDER NOTES
History     Chief Complaint   Patient presents with     Fatigue     HPI  Urban Huizar is a 86 year old male who is accompanied per his wife.  He tested positive for COVID with a home COVID test 3 days ago.  Symptoms include little bit of fatigue, postnasal drip, runny nose, and cough. took acetaminophen 2 days ago.  His wife was positive for COVID last week.  History of vertigo, pneumonia, and type 2 diabetes.  Quit smoking 20 years ago.  Had second COVID vaccination March, 2021.Denies fevers, chills, nausea, vomiting, diarrhea, and shortness of breath.    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 No pertinent past medical history.    Past Surgical History:    Past Surgical History:   Procedure Laterality Date     INCISION AND DRAINAGE ORAL, COMBINED N/A 8/19/2019    Procedure: INCISION AND DRAINAGE CENTRAL TONGUE ABSCESS Direct Laryngoscopy;  Surgeon: Madie Lew MD;  Location: [facility]     PHACOEMULSIFICATION WITH STANDARD INTRAOCULAR LENS IMPLANT Left 2/26/2018    Procedure: PHACOEMULSIFICATION WITH STANDARD INTRAOCULAR LENS IMPLANT;  PHACOEMULSIFICATION CATARACT EXTRACTION POSTERIOR CHAMBER LENS LEFT;  Surgeon: Baljinder Shannon MD;  Location: [facility]       Family History:    History reviewed. No pertinent family history.    Social History:  Marital Status:    [2]  Social History     Tobacco Use     Smoking status: Former Smoker     Packs/day: 4.00     Years: 30.00     Pack years: 120.00     Types: Cigarettes     Start date: 1946     Quit date: 1977     Years since quittin.2     Smokeless tobacco: Never Used   Substance Use Topics     Alcohol use: Yes     Comment: rarely     Drug use: No        Medications:    allopurinol (ZYLOPRIM) 100 MG tablet  ascorbic acid 1000 MG TABS tablet  blood glucose (ACCU-CHEK GUIDE) test strip  cinnamon 500 MG TABS  glipiZIDE (GLUCOTROL) 10 MG tablet  Glucosamine Sulfate 1000 MG TABS  guaiFENesin-codeine (ROBITUSSIN AC) 100-10 MG/5ML solution  latanoprost (XALATAN) 0.005 % ophthalmic solution  metFORMIN (GLUCOPHAGE) 1000 MG tablet  omega-3 acid ethyl esters (LOVAZA) 1 G capsule  pioglitazone (ACTOS) 30 MG tablet  timolol maleate (TIMOPTIC) 0.5 % ophthalmic solution  Turmeric 500 MG CAPS  Vitamin D3 (CHOLECALCIFEROL) 25 mcg (1000 units) tablet  vitamin E (TOCOPHEROL) 1000 units (450 mg) CAPS capsule          Review of Systems   Constitutional: Positive for activity change and fatigue (little bit). Negative for appetite change, chills and fever.   HENT: Positive for postnasal drip and rhinorrhea. Negative for ear pain and sore throat.    Eyes: Negative.         Glazed over eyes   Respiratory: Positive for cough. Negative for shortness of breath.    Gastrointestinal: Negative for diarrhea, nausea and vomiting.   Genitourinary: Negative.    Musculoskeletal: Negative for myalgias.   Skin: Negative.    Neurological: Positive for light-headedness (vertigo. appt with PT). Negative for dizziness and headaches.       Physical Exam   BP: 147/73  Pulse: 61  Temp: 97.5  F (36.4  C)  Resp: 16  SpO2: 95 %      Physical Exam  Vitals and nursing note reviewed.   Constitutional:       General: He is not in acute distress.  HENT:      Head: Normocephalic.      Right Ear: Tympanic membrane and ear canal normal.      Left Ear: Tympanic  membrane and ear canal normal.      Nose: Nose normal.      Right Sinus: No maxillary sinus tenderness or frontal sinus tenderness.      Left Sinus: No maxillary sinus tenderness or frontal sinus tenderness.      Mouth/Throat:      Lips: Pink.      Mouth: Mucous membranes are moist.      Pharynx: Uvula midline. No posterior oropharyngeal erythema.   Eyes:      Conjunctiva/sclera: Conjunctivae normal.   Cardiovascular:      Rate and Rhythm: Normal rate and regular rhythm.      Heart sounds: Normal heart sounds. No murmur heard.  Pulmonary:      Effort: Pulmonary effort is normal. No respiratory distress.      Breath sounds: Normal air entry. Examination of the right-lower field reveals rales. Rales present. No wheezing.   Lymphadenopathy:      Cervical: Cervical adenopathy (small to moderate) present.      Right cervical: Superficial cervical adenopathy present.      Left cervical: Superficial cervical adenopathy present.   Skin:     General: Skin is warm and dry.   Neurological:      Mental Status: He is alert and oriented to person, place, and time.   Psychiatric:         Behavior: Behavior normal.         ED Course                 Procedures             Results for orders placed or performed during the hospital encounter of 05/24/22 (from the past 24 hour(s))   Symptomatic; Yes; 5/22/2022 Influenza A/B & SARS-CoV2 (COVID-19) Virus PCR Multiplex Nasopharyngeal    Specimen: Nasopharyngeal; Swab   Result Value Ref Range    Influenza A PCR Negative Negative    Influenza B PCR Negative Negative    RSV PCR Negative Negative    SARS CoV2 PCR Positive (A) Negative    Narrative    Testing was performed using the Xpert Xpress CoV2/Flu/RSV Assay on the [employer]pert Instrument. This test should be ordered for the detection of SARS-CoV-2 and influenza viruses in individuals who meet clinical and/or epidemiological criteria. Test performance is unknown in asymptomatic patients. This test is for in vitro diagnostic use  under the FDA EUA for laboratories certified under CLIA to perform high or moderate complexity testing. This test has not been FDA cleared or approved. A negative result does not rule out the presence of PCR inhibitors in the specimen or target RNA in concentration below the limit of detection for the assay. If only one viral target is positive but coinfection with multiple targets is suspected, the sample should be re-tested with another FDA cleared, approved, or authorized test, if coinfection would change clinical management. This test was validated by the [facility]. These laboratories are certified under the Clinical  Laboratory Improvement Amendments of 1988 (CLIA-88) as qualified to perform high complexity laboratory testing.       Medications   dexamethasone (DECADRON) injectable solution used ORALLY 10 mg (10 mg Oral Given 5/24/22 1512)       Assessments & Plan (with Medical Decision Making)     I have reviewed the nursing notes.    I have reviewed the findings, diagnosis, plan and need for follow up with the patient.  (U07.1) Infection due to 2019 novel coronavirus  Comment: 86 year old male who is accompanied per his wife.  He tested positive for COVID with a home COVID test 3 days ago.  Symptoms include little bit of fatigue, postnasal drip, runny nose, and cough. took acetaminophen 2 days ago.  His wife was positive for COVID last week.  History of vertigo, pneumonia, and type 2 diabetes.  Quit smoking 20 years ago.  Had second COVID vaccination March, 2021.Denies fevers, chills, nausea, vomiting, diarrhea, and shortness of breath.    MDM:NHT. Lungs CTA    Multiplex nasopharyngeal swab positive for COVID    Refusing Paxlovid    Plan: Education provided for coronavirus.  Loratadine (Claritin) or cetirizine (Zyrtec) 20 mg  daily for ten to fourteen days to see if symptoms lessen or resolve. If the medication seems to help you may take 10 mg daily on an ongoing basis.  May buy over the  counter.    Return to ER for worsening of symptoms  These discharge instructions and medications were reviewed with him and his wife and understanding verbalized.    This document was prepared using a combination of typing and voice generated software.  While every attempt was made for accuracy, spelling and grammatical errors may exist.    Discharge Medication List as of 5/24/2022  3:15 PM          Final diagnoses:   Infection due to 2019 novel coronavirus       5/24/2022   [facility]       Zandra Sunshine, CNP  05/24/22 [id number]

## 2022-05-24 NOTE — DISCHARGE INSTRUCTIONS
Loratadine (Claritin) or cetirizine (Zyrtec) 20 mg  daily for ten to fourteen days to see if symptoms lessen or resolve. If the medication seems to help you may take 10 mg daily on an ongoing basis.  May buy over the counter.    Return to ER for worsening of symptoms

## 2022-05-24 NOTE — ED TRIAGE NOTES
Patient presents to urgent care with wife for fatigue due to being COVID + on Saturday.   Patient's wife just wants him looked at because he is diabetic.

## 2022-05-25 NOTE — TELEPHONE ENCOUNTER
Coronavirus (COVID-19) Notification    Caller Name (Patient, parent, daughter/son, grandparent, etc)  Patient    Reason for call  Notify of Positive Coronavirus (COVID-19) lab results, assess symptoms,  review [facility] recommendations    Lab Result    Lab test:  2019-nCoV rRt-PCR or SARS-CoV-2 PCR    Oropharyngeal AND/OR nasopharyngeal swabs is POSITIVE for 2019-nCoV RNA/SARS-COV-2 PCR (COVID-19 virus)      Gather patient reported symptoms   Assessment   Current Symptoms at time of phone call, reported by patient: (if no symptoms, document: No symptoms] Patient notified/instructed by care team per patient   Date of symptom(s) onset (if applicable) 5/14/2022     If at time of call, Patients symptoms have worsened, the Patient should contact 911 or have someone drive them to Emergency Dept promptly:      If Patient calling 911, inform 911 personal that you have tested positive for the Coronavirus (COVID-19).  Place mask on and await 911 to arrive.    If Emergency Dept, If possible, please have another adult drive you to the Emergency Dept but you need to wear mask when in contact with other people.      Treatment Options:   Patient classified as COVID treatment eligible by Epic high risk algorithm: Yes  Is the patient symptomatic at the time of result notification? No    Review information with Patient    Your result was positive. This means you have COVID-19 (coronavirus).    How can I protect others?    These guidelines are for isolating before returning to work, school or .    If you DO have symptoms    Stay home and away from others     For at least 5 days after your symptoms started, AND    You are fever free for 24 hours (with no medicine that reduces fever), AND    Your other symptoms are better    Wear a mask for 10 full days anytime you are around others    If you DON'T have symptoms    Stay home and away from others for at least 5 days after your positive test    Wear a mask for 10 full days  anytime you are around others    There may be different guidelines for healthcare facilities.  Please check with the specific sites before arriving.    If you have been told by a doctor that you were severely ill with COVID-19 or are immunocompromised, you should isolate for at least 10 days.    You should not go back to work until you meet the guidelines above for ending your home isolation. You don't need to be retested for COVID-19 before going back to work--studies show that you won't spread the virus if it's been at least 10 days since your symptoms started (or 20 days, if you have a weak immune system).    Employers, schools, and daycares: This is an official notice for this person's medical guidelines for returning in-person.  They must meet the above guidelines before going back to work, school or  in person.    You will receive a positive COVID-19 letter via [employer] or the mail soon with additional self-care information (exception, no letters will be sent to presurgical/preprocedure patients).    Would you like me to review some of that information with you now?  No    If you were tested for an upcoming procedure, please contact your provider for next steps.    Heena Hernandez LPN

## 2025-08-11 ENCOUNTER — HOSPITAL ENCOUNTER (EMERGENCY)
Facility: HOSPITAL | Age: 89
End: 2025-08-11
Payer: COMMERCIAL

## (undated) DEVICE — SUTURE-SILK 0 PSL 580H

## (undated) DEVICE — IRRIGATION-H2O 1000ML

## (undated) DEVICE — BLANKET-BAIR LOWER EXTREMITY

## (undated) DEVICE — BETADINE 5% STERILE OPHTHALMIC SOLUTION 1 OZ.

## (undated) DEVICE — PACK-EYE-CUSTOM

## (undated) DEVICE — BIN-TECNIS DCB00 LENSES

## (undated) DEVICE — KNIFE-MICRO UNITOME 5.0MM

## (undated) DEVICE — ANTI-FOG AGENT

## (undated) DEVICE — GOWN-SURG XL LVL 3 REINFORCED

## (undated) DEVICE — DRSG-NEURO SPONGE 1/2" X 3"

## (undated) DEVICE — CYSTOTOME-IRRIGATING  25G

## (undated) DEVICE — DRSG-NON ADHERING 3 X 8 TELFA

## (undated) DEVICE — GLV-7.5 PROTEXIS PI CLASSIC LF/PF

## (undated) DEVICE — IRRIGATION-NACL 1000ML

## (undated) DEVICE — HANDPIECE-CAPSULEGUARD I/A STELLARIS

## (undated) DEVICE — CANISTER-SUCTION 2000CC

## (undated) DEVICE — INSTRUMENT WIPE-VISIWIPE

## (undated) DEVICE — LENS DELIVERY SYSTEM-SOFPORT LI61AO (EZ-28)

## (undated) DEVICE — NDL-SPINAL 25G X 3.5IN QUINCKE

## (undated) DEVICE — NDL-BLUNT FILL 18G 1.5"

## (undated) DEVICE — PACK-PHACO STELLARIS

## (undated) DEVICE — SCD SLEEVE-KNEE REG.

## (undated) DEVICE — BIN-CATARACT BIN

## (undated) DEVICE — GLV-6.5 PROTEXIS PI CLASSIC LF/PF

## (undated) DEVICE — GLV-7.0 PROTEXIS PI CLASSIC LF/PF

## (undated) DEVICE — CANNULA-NUCLEUS HYDRODISSECTOR

## (undated) DEVICE — TUBING-SUCTION 20FT

## (undated) DEVICE — BIN-LENS IMPLANT CART

## (undated) DEVICE — KNIFE-KERATOME SLIT 2.8MM

## (undated) DEVICE — LABEL-STERILE PREPRINTED FOR OR

## (undated) DEVICE — CONTAINER-STERILE 4OZ WRAPPED (OR)

## (undated) DEVICE — SUCTION TUBE-YANKAUR

## (undated) DEVICE — PACK-SET UP-CUSTOM

## (undated) RX ORDER — FENTANYL CITRATE 50 UG/ML
INJECTION, SOLUTION INTRAMUSCULAR; INTRAVENOUS
Status: DISPENSED
Start: 2019-08-19

## (undated) RX ORDER — ROCURONIUM BROMIDE 50 MG/5 ML
SYRINGE (ML) INTRAVENOUS
Status: DISPENSED
Start: 2019-08-19

## (undated) RX ORDER — PHENYLEPHRINE HCL IN 0.9% NACL 1 MG/10 ML
SYRINGE (ML) INTRAVENOUS
Status: DISPENSED
Start: 2019-08-19

## (undated) RX ORDER — PROPOFOL 10 MG/ML
INJECTION, EMULSION INTRAVENOUS
Status: DISPENSED
Start: 2019-08-19

## (undated) RX ORDER — LIDOCAINE HYDROCHLORIDE 20 MG/ML
INJECTION, SOLUTION EPIDURAL; INFILTRATION; INTRACAUDAL; PERINEURAL
Status: DISPENSED
Start: 2018-02-26

## (undated) RX ORDER — LIDOCAINE HYDROCHLORIDE 20 MG/ML
INJECTION, SOLUTION EPIDURAL; INFILTRATION; INTRACAUDAL; PERINEURAL
Status: DISPENSED
Start: 2019-08-19